# Patient Record
Sex: MALE | Race: ASIAN | NOT HISPANIC OR LATINO | Employment: FULL TIME | URBAN - METROPOLITAN AREA
[De-identification: names, ages, dates, MRNs, and addresses within clinical notes are randomized per-mention and may not be internally consistent; named-entity substitution may affect disease eponyms.]

---

## 2017-10-20 ENCOUNTER — ALLSCRIPTS OFFICE VISIT (OUTPATIENT)
Dept: OTHER | Facility: OTHER | Age: 42
End: 2017-10-20

## 2017-10-20 DIAGNOSIS — Z00.00 ENCOUNTER FOR GENERAL ADULT MEDICAL EXAMINATION WITHOUT ABNORMAL FINDINGS: ICD-10-CM

## 2017-10-20 DIAGNOSIS — E66.3 OVERWEIGHT: ICD-10-CM

## 2017-10-21 ENCOUNTER — LAB CONVERSION - ENCOUNTER (OUTPATIENT)
Dept: OTHER | Facility: OTHER | Age: 42
End: 2017-10-21

## 2017-10-21 LAB
A/G RATIO (HISTORICAL): 1.8 (CALC) (ref 1–2.5)
ALBUMIN SERPL BCP-MCNC: 4.5 G/DL (ref 3.6–5.1)
ALP SERPL-CCNC: 66 U/L (ref 40–115)
ALT SERPL W P-5'-P-CCNC: 54 U/L (ref 9–46)
AST SERPL W P-5'-P-CCNC: 24 U/L (ref 10–40)
BASOPHILS # BLD AUTO: 1.2 %
BASOPHILS # BLD AUTO: 80 CELLS/UL (ref 0–200)
BILIRUB SERPL-MCNC: 0.4 MG/DL (ref 0.2–1.2)
BUN SERPL-MCNC: 17 MG/DL (ref 7–25)
BUN/CREA RATIO (HISTORICAL): ABNORMAL (CALC) (ref 6–22)
CALCIUM SERPL-MCNC: 9.5 MG/DL (ref 8.6–10.3)
CHLORIDE SERPL-SCNC: 104 MMOL/L (ref 98–110)
CHOLEST SERPL-MCNC: 171 MG/DL
CHOLEST/HDLC SERPL: 5.7 (CALC)
CO2 SERPL-SCNC: 29 MMOL/L (ref 20–31)
CREAT SERPL-MCNC: 0.97 MG/DL (ref 0.6–1.35)
DEPRECATED RDW RBC AUTO: 13.2 % (ref 11–15)
EGFR AFRICAN AMERICAN (HISTORICAL): 110 ML/MIN/1.73M2
EGFR-AMERICAN CALC (HISTORICAL): 95 ML/MIN/1.73M2
EOSINOPHIL # BLD AUTO: 208 CELLS/UL (ref 15–500)
EOSINOPHIL # BLD AUTO: 3.1 %
GAMMA GLOBULIN (HISTORICAL): 2.5 G/DL (CALC) (ref 1.9–3.7)
GLUCOSE (HISTORICAL): 102 MG/DL (ref 65–99)
HCT VFR BLD AUTO: 43.3 % (ref 38.5–50)
HDLC SERPL-MCNC: 30 MG/DL
HGB BLD-MCNC: 15 G/DL (ref 13.2–17.1)
LDL CHOLESTEROL (HISTORICAL): 109 MG/DL (CALC)
LYMPHOCYTES # BLD AUTO: 2660 CELLS/UL (ref 850–3900)
LYMPHOCYTES # BLD AUTO: 39.7 %
MCH RBC QN AUTO: 30.9 PG (ref 27–33)
MCHC RBC AUTO-ENTMCNC: 34.6 G/DL (ref 32–36)
MCV RBC AUTO: 89.3 FL (ref 80–100)
MONOCYTES # BLD AUTO: 409 CELLS/UL (ref 200–950)
MONOCYTES (HISTORICAL): 6.1 %
NEUTROPHILS # BLD AUTO: 3343 CELLS/UL (ref 1500–7800)
NEUTROPHILS # BLD AUTO: 49.9 %
NON-HDL-CHOL (CHOL-HDL) (HISTORICAL): 141 MG/DL (CALC)
PLATELET # BLD AUTO: 306 THOUSAND/UL (ref 140–400)
PMV BLD AUTO: 9.3 FL (ref 7.5–12.5)
POTASSIUM SERPL-SCNC: 4.4 MMOL/L (ref 3.5–5.3)
RBC # BLD AUTO: 4.85 MILLION/UL (ref 4.2–5.8)
SODIUM SERPL-SCNC: 139 MMOL/L (ref 135–146)
TOTAL PROTEIN (HISTORICAL): 7 G/DL (ref 6.1–8.1)
TRIGL SERPL-MCNC: 206 MG/DL
TSH SERPL DL<=0.05 MIU/L-ACNC: 1.37 MIU/L (ref 0.4–4.5)
WBC # BLD AUTO: 6.7 THOUSAND/UL (ref 3.8–10.8)

## 2017-10-22 ENCOUNTER — GENERIC CONVERSION - ENCOUNTER (OUTPATIENT)
Dept: OTHER | Facility: OTHER | Age: 42
End: 2017-10-22

## 2017-10-23 ENCOUNTER — GENERIC CONVERSION - ENCOUNTER (OUTPATIENT)
Dept: OTHER | Facility: OTHER | Age: 42
End: 2017-10-23

## 2018-01-14 VITALS — SYSTOLIC BLOOD PRESSURE: 124 MMHG | DIASTOLIC BLOOD PRESSURE: 84 MMHG

## 2018-01-14 NOTE — PROGRESS NOTES
Assessment    1  Family history of diabetes mellitus (V18 0) (Z83 3) : Father   2  Family history of Benign hypertension : Father   3  Family history of coronary artery disease (V17 3) (Z82 49) : Father   4  Never a smoker   5  Social alcohol use (Z78 9)   6  Encounter for preventive health examination (V70 0) (Z00 00)   7  Overweight (278 02) (E66 3)    Plan  Health Maintenance, Overweight    · (1) CBC/PLT/DIFF; Status:Active; Requested MOR:[id number];    · (1) COMPREHENSIVE METABOLIC PANEL; Status:Active; Requested NNE:[id number];    · (1) LIPID PANEL, FASTING; Status:Active; Requested for:2017;    · (1) TSH WITH FT4 REFLEX; Status:Active; Requested [id number];    · Follow-up visit in 4 Months Evaluation and Treatment  Follow-up  Status: Complete  Done:  2017   · Influenza; INJECT 0 5  ML Intramuscular; To Be Done: [id number]    Discussion/Summary  Impression: health maintenance visit, healthy adult male  Currently, he eats an adequate diet and has an adequate exercise regimen  Prostate cancer screening: PSA is not indicated  Testicular cancer screening: the risks and benefits of testicular cancer screening were discussed, self testicular exam technique was taught, monthly self testicular exam was advised, testicular cancer screening is current and clinical testicular exam was done today  Colorectal cancer screening: colorectal cancer screening is not indicated  Screening lab work includes glucose, lipid profile and CBC and TSH  The risks and benefits of immunizations were discussed, immunizations are up to date and Influenza Vaccine was given IM today, and tolerated well  He was advised to be evaluated by a dentist  Advice and education were given regarding nutrition, aerobic exercise, weight loss and Pt to focus on a lower carb diet, and continuing his regular exercise  Patient discussion: discussed with the patient, Stone is to f/u in 4 months, or sooner PRN        Chief Complaint  PT is being seen today for a HM visit  has a NEW PT  PT is fasting for BW  History of Present Illness  HM, Adult Male: The patient is being seen for a health maintenance evaluation  The last health maintenance visit was 4 year(s) ago  General Health: The patient's health since the last visit is described as good   PMHx: None  FamHx significant - Father (non-smoker)  Non-smoker, , 2 kids, 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[employer], running  There is no reported family hx for prostate or colon cancer  He denies vision problems  He denies hearing loss  Immunizations status: up to date  Lifestyle:  He does not have a healthy diet  He has weight concerns  He exercises regularly  He does not use tobacco  He consumes alcohol  He reports occasional alcohol use  He denies drug use  Reproductive health:  the patient is sexually active  He denies erectile dysfunction  Screening: cancer screening reviewed and current  metabolic screening reviewed and current  risk screening reviewed and current  Additional History:  No CP/SOB  No abd pain or rectal bleeding  Urine flow is fine  No ED  No anxiety or depression  HPI: As above  Review of Systems    Constitutional: as noted in HPI  Cardiovascular: as noted in HPI  Respiratory: as noted in HPI  Gastrointestinal: as noted in HPI  Genitourinary: as noted in HPI  Psychiatric: as noted in HPI  Family History  Father    · Family history of Benign hypertension   · Family history of coronary artery disease (V17 3) (Z82 49)   · Family history of diabetes mellitus (V18 0) (Z83 3)    Social History    · Never a smoker   · Social alcohol use (Z78 9)    Current Meds   1  No Reported Medications Recorded    Allergies    1  No Known Drug Allergies    Vitals   Recorded: [id number] 20:39SM   Systolic 157, Sitting   Diastolic 84, Sitting     Physical Exam    Constitutional   General appearance: No acute distress, well appearing and well nourished    NAD; VSS; pleasant  Head and Face   Head and face: Normal     Eyes   Conjunctiva and lids: No erythema, swelling or discharge  Ears, Nose, Mouth, and Throat   External inspection of ears and nose: Normal     Otoscopic examination: Tympanic membranes translucent with normal light reflex  Canals patent without erythema  Hearing: Normal     Lips, teeth, and gums: Normal, good dentition  Oropharynx: Normal with no erythema, edema, exudate or lesions  Neck   Neck: Supple, symmetric, trachea midline, no masses  Pulmonary   Respiratory effort: No increased work of breathing or signs of respiratory distress  Auscultation of lungs: Clear to auscultation  Cardiovascular   Auscultation of heart: Normal rate and rhythm, normal S1 and S2, no murmurs  Pedal pulses: 2+ bilaterally  Examination of extremities for edema and/or varicosities: Normal     Abdomen   Abdomen: Non-tender, no masses  Liver and spleen: No hepatomegaly or splenomegaly  Examination for hernias: No hernias appreciated  Genitourinary   Scrotal contents: Normal testes, no masses  Penis: Normal, no lesions  Lymphatic   Palpation of lymph nodes in neck: No lymphadenopathy  Musculoskeletal   Gait and station: Normal     Psychiatric   Judgment and insight: Normal     Orientation to person, place and time: Normal     Recent and remote memory: Intact  Mood and affect: Normal        Results/Data  PHQ-2 Adult Depression Screening 20Oct2017 10:44AM User, Ahs     Test Name Result Flag Reference   PHQ-2 Adult Depression Score 0     Over the last two weeks, how often have you been bothered by any of the following problems?   Little interest or pleasure in doing things: Not at all - 0  Feeling down, depressed, or hopeless: Not at all - 0   PHQ-2 Adult Depression Screening Negative         Future Appointments    Date/Time Provider Specialty Site   02/23/2018 10:45 AM Carolynn Cleaning,  Family Medicine [address] [facility] Signatures   Electronically signed by : Maciej Asif DO; Oct 20 2017  1:19PM EST                       (Author)

## 2018-01-17 NOTE — RESULT NOTES
Verified Results  (1) COMPREHENSIVE METABOLIC PANEL [id number] 41:94FI Hira Cleaning     Test Name Result Flag Reference   GLUCOSE 102 mg/dL H 65-99   Fasting reference interval     For someone without known diabetes, a glucose value  between 100 and 125 mg/dL is consistent with  prediabetes and should be confirmed with a  follow-up test    UREA NITROGEN (BUN) 17 mg/dL  7-25   CREATININE 0 97 mg/dL  0 60-1 35   eGFR NON-AFR  AMERICAN 95 mL/min/1 73m2  > OR = 60   eGFR AFRICAN AMERICAN 110 mL/min/1 73m2  > OR = 60   BUN/CREATININE RATIO   8-20   NOT APPLICABLE (calc)   SODIUM 139 mmol/L  135-146   POTASSIUM 4 4 mmol/L  3 5-5 3   CHLORIDE 104 mmol/L     CARBON DIOXIDE 29 mmol/L  20-31   CALCIUM 9 5 mg/dL  8 6-10 3   PROTEIN, TOTAL 7 0 g/dL  6 1-8 1   ALBUMIN 4 5 g/dL  3 6-5 1   GLOBULIN 2 5 g/dL (calc)  1 9-3 7   ALBUMIN/GLOBULIN RATIO 1 8 (calc)  1 0-2 5   BILIRUBIN, TOTAL 0 4 mg/dL  0 2-1 2   ALKALINE PHOSPHATASE 66 U/L     AST 24 U/L  10-40   ALT 54 U/L H 9-46     (1) LIPID PANEL, FASTING 20Oct2017 11:44AM Hira Cleaning     Test Name Result Flag Reference   CHOLESTEROL, TOTAL 171 mg/dL  <200   HDL CHOLESTEROL 30 mg/dL L >79   TRIGLICERIDES 018 mg/dL H <150   LDL-CHOLESTEROL 109 mg/dL (calc) H    Reference range: <100     Desirable range <100 mg/dL for patients with CHD or  diabetes and <70 mg/dL for diabetic patients with  known heart disease  LDL-C is now calculated using the Paul-Thompson   calculation, which is a validated novel method providing   better accuracy than the Friedewald equation in the   estimation of LDL-C  Elsie Buitrago  Henrene Batman  8562;863(31): 2434-3153   (http://InsideSales.com/faq/TEI360)   CHOL/HDLC RATIO 5 7 (calc) H <5 0   NON HDL CHOLESTEROL 141 mg/dL (calc) H <130   For patients with diabetes plus 1 major ASCVD risk   factor, treating to a non-HDL-C goal of <100 mg/dL   (LDL-C of <70 mg/dL) is considered a therapeutic   option       (1) CBC/PLT/DIFF [id number] 11:44AM Hira Cleaning     Test Name Result Flag Reference   WHITE BLOOD CELL COUNT 6 7 Thousand/uL  3 8-10 8   RED BLOOD CELL COUNT 4 85 Million/uL  4 20-5 80   HEMOGLOBIN 15 0 g/dL  13 2-17 1   HEMATOCRIT 43 3 %  38 5-50 0   MCV 89 3 fL  80 0-100 0   MCH 30 9 pg  27 0-33 0   MCHC 34 6 g/dL  32 0-36 0   RDW 13 2 %  11 0-15 0   PLATELET COUNT 653 Thousand/uL  140-400   ABSOLUTE NEUTROPHILS 3343 cells/uL  3461-9577   ABSOLUTE LYMPHOCYTES 2660 cells/uL  850-3900   ABSOLUTE MONOCYTES 409 cells/uL  200-950   ABSOLUTE EOSINOPHILS 208 cells/uL     ABSOLUTE BASOPHILS 80 cells/uL  0-200   NEUTROPHILS 49 9 %     LYMPHOCYTES 39 7 %     MONOCYTES 6 1 %     EOSINOPHILS 3 1 %     BASOPHILS 1 2 %     MPV 9 3 fL  7 5-12 5     (Q) TSH, 3RD GENERATION W/REFLEX TO FT4 20Oct2017 11:44AM Hira Cleaning   REPORT COMMENT:  FASTING:YES     Test Name Result Flag Reference   TSH W/REFLEX TO FT4 1 37 mIU/L  0 40-4 50

## 2018-02-23 ENCOUNTER — OFFICE VISIT (OUTPATIENT)
Dept: FAMILY MEDICINE CLINIC | Facility: CLINIC | Age: 43
End: 2018-02-23
Payer: COMMERCIAL

## 2018-02-23 VITALS
RESPIRATION RATE: 16 BRPM | DIASTOLIC BLOOD PRESSURE: 64 MMHG | SYSTOLIC BLOOD PRESSURE: 116 MMHG | HEART RATE: 64 BPM | WEIGHT: 245 LBS

## 2018-02-23 DIAGNOSIS — E78.1 HYPERTRIGLYCERIDEMIA: Primary | ICD-10-CM

## 2018-02-23 PROCEDURE — 99213 OFFICE O/P EST LOW 20 MIN: CPT | Performed by: FAMILY MEDICINE

## 2018-02-23 NOTE — ASSESSMENT & PLAN NOTE
Stone is stable on exam   He is to f/u here in 6 months, or sooner PRN  We again discussed his labs today  Pt is to continue his lower carb diet, with also less red meat / fried food / butter, regular exercise, and work on weight loss  He may very well have some fatty liver by his labs  Next FBW was ordered

## 2018-02-23 NOTE — PROGRESS NOTES
Assessment/Plan:    Hypertriglyceridemia  Stone is stable on exam   He is to f/u here in 6 months, or sooner PRN  We again discussed his labs today  Pt is to continue his lower carb diet, with also less red meat / fried food / butter, regular exercise, and work on weight loss  He may very well have some fatty liver by his labs  Next FBW was ordered  Diagnoses and all orders for this visit:    Hypertriglyceridemia  -     Comprehensive metabolic panel; Future  -     Lipid panel; Future          Subjective:      Patient ID: Basia Melo is a 36 y o  male  Last labs - In allscripts reviewed  Watching his diet; has been doing the cardio exercise  The following portions of the patient's history were reviewed and updated as appropriate: allergies, current medications, past family history, past social history, past surgical history and problem list     No past medical history on file  No current outpatient prescriptions on file  No Known Allergies      Review of Systems   Constitutional: Negative for activity change  Respiratory: Negative for shortness of breath  Cardiovascular: Negative for chest pain  Objective:      /64 (BP Location: Right arm, Patient Position: Sitting, Cuff Size: Standard)   Pulse 64   Resp 16   Wt 111 kg (245 lb)          Physical Exam   Constitutional: He is oriented to person, place, and time  He appears well-developed and well-nourished  No distress  HENT:   Head: Normocephalic and atraumatic  Eyes: Conjunctivae are normal    Cardiovascular: Normal rate, regular rhythm and normal heart sounds  Exam reveals no gallop and no friction rub  No murmur heard  Pulmonary/Chest: Breath sounds normal  No respiratory distress  He has no wheezes  He has no rales  Neurological: He is alert and oriented to person, place, and time  Skin: He is not diaphoretic  Psychiatric: He has a normal mood and affect   His behavior is normal  Judgment and thought content normal    Nursing note and vitals reviewed

## 2018-03-14 ENCOUNTER — TRANSCRIBE ORDERS (OUTPATIENT)
Dept: PHYSICAL THERAPY | Facility: CLINIC | Age: 43
End: 2018-03-14

## 2018-03-14 ENCOUNTER — EVALUATION (OUTPATIENT)
Dept: PHYSICAL THERAPY | Facility: CLINIC | Age: 43
End: 2018-03-14
Payer: COMMERCIAL

## 2018-03-14 DIAGNOSIS — M19.019 ACROMIOCLAVICULAR JOINT ARTHRITIS: Primary | ICD-10-CM

## 2018-03-14 DIAGNOSIS — M25.512 CHRONIC LEFT SHOULDER PAIN: ICD-10-CM

## 2018-03-14 DIAGNOSIS — M19.019 OSTEOARTHRITIS OF ACROMIOCLAVICULAR JOINT: Primary | ICD-10-CM

## 2018-03-14 DIAGNOSIS — G89.29 CHRONIC LEFT SHOULDER PAIN: ICD-10-CM

## 2018-03-14 PROCEDURE — 97162 PT EVAL MOD COMPLEX 30 MIN: CPT

## 2018-03-14 PROCEDURE — G8985 CARRY GOAL STATUS: HCPCS

## 2018-03-14 PROCEDURE — 97110 THERAPEUTIC EXERCISES: CPT

## 2018-03-14 PROCEDURE — G8984 CARRY CURRENT STATUS: HCPCS

## 2018-03-14 NOTE — LETTER
2018    Reford DO Tien  [place] [address] [facility]    Patient: Andrew Adler   YOB: 1975   Date of Visit: 3/14/2018     Encounter Diagnosis     ICD-10-CM    1  Osteoarthritis of acromioclavicular joint M19 019 CANCELED: PT plan of care cert/re-cert   2  Chronic left shoulder pain M25 512 CANCELED: PT plan of care cert/re-cert    A85 57        Dear Dr Elle Kaplan:    Please review the attached Plan of Care from Stone Frank's recent visit  Please verify that you agree therapy should continue by signing the attached document and sending it back to our office  If you have any questions or concerns, please don't hesitate to call  Sincerely,    Darylene Pebbles, PT      Referring Provider:      I certify that I have read the below Plan of Care and certify the need for these services furnished under this plan of treatment while under my care  Reford DO Tien  [address] [facility]: [phone]          PT Evaluation        Today's date: 3/22/2018  Patient name: Andrew Adler  : 1975  MRN: [id number]  Referring provider: Lizette Flores DO  Dx:   Encounter Diagnosis     ICD-10-CM    1  Osteoarthritis of acromioclavicular joint M19 019 PT plan of care cert/re-cert   2  Chronic left shoulder pain M25 512 PT plan of care cert/re-cert    Z56 57        Start Time: 705  Stop Time: 745  Total time in clinic (min): 40 minutes    Assessment  Impairments: abnormal or restricted ROM, activity intolerance, impaired physical strength, lacks appropriate home exercise program and pain with function    Assessment details: Patient is a 35 y/o male who presents with the above impairments  Patient will benefit from skilled PT to minimize impairments and maximize function   Thank you for the referral    Understanding of Dx/Px/POC: good   Prognosis: good    Goals  Patient will demonstrate minimum 1/2 grade MMT improvement in scapular stabilizers  Patient will demonstrate painfree shoulder AROM with minimum 10 degrees improvement in flexion, abduction  Patient will report 50% improvement in pain  Patient will report 50% improvement in pain levels with functional activities  Plan  Planned therapy interventions: joint mobilization, manual therapy, massage, neuromuscular re-education, stretching, therapeutic exercise, functional ROM exercises, flexibility, graded exercise and home exercise program  Frequency: 2x week  Duration in visits: 8  Duration in weeks: 6  Plan details: 2x a week decreased to 1x per week        Subjective Evaluation    History of Present Illness  Mechanism of injury: Patient reports shoulder pain began last November  He reports doing conservative exercises for 4 weeks with minimal improvement  The pain has not stopped him from doing any activities  He reports clicking, but no locking  He denies pain down his arm  Denies numbness, tingling, burning  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, he reports enjoying playing volleyball, riding a motorcycle, soccer with the kids     Pain  No pain reported  Current pain ratin  At best pain ratin  At worst pain rating: 3    Patient Goals  Patient goal: Improvement in pain levels        Objective     Cervical/Thoracic Screen   Cervical range of motion within normal limits with the following exceptions: Cervical ROM WNL and painfree    Active Range of Motion   Left Shoulder   Flexion: 125 degrees   Extension: 68 degrees with pain  Abduction: 138 degrees   External rotation BTH: T2   Internal rotation BTB: L2 with pain    Right Shoulder   Flexion: 145 degrees   Extension: 80 degrees   Abduction: 130 degrees   External rotation BTH: T2   Internal rotation BTB: L2     Passive Range of Motion   Left Shoulder   Flexion: 155 degrees   Abduction: 155 degrees   External rotation 90°:  75 degrees   Internal rotation 90°:  15 degrees     Right Shoulder   Flexion: 165 degrees   Abduction: 165 degrees   External rotation 90°:  80 degrees   Internal rotation 90°:  30 degrees     Strength/Myotome Testing     Left Shoulder     Planes of Motion   Flexion: 5   Abduction: 4+   External rotation at 0°:  4+     Isolated Muscles   Latissimus: 4+   Lower trapezius: 4   Middle trapezius: 4+   Rhomboids: 4+     Right Shoulder     Planes of Motion   Flexion: 5   Abduction: 5   External rotation at 0°:  5     Tests     Left Shoulder   Negative Hawkin's  General Comments     Shoulder Comments   Patient is tender to palpation over the R [facility] joint/ anterior shoulder  Not tender to palpation over clavicle, lateral shoulder, greater tubercle, biceps, triceps, UT supraspinatus/ infraspinatus muscle belly  Flowsheet Rows    Flowsheet Row Most Recent Value   PT/OT G-Codes   FOTO information reviewed  Yes   Assessment Type  Evaluation   G code set  Carrying, Moving & Handling Objects   Carrying, Moving and Handling Objects Current Status ()  CI   Carrying, Moving and Handling Objects Goal Status ()  CI          Precautions: BMI > 30;  Hx LBP    Daily Treatment Diary     Manual              Post/ inf shoulder joint mobs grade 3-4             A-C joint mobs grade 1-2                                                        Exercise Diary              UBE             Sleeper stretch 20 x 5 sec            Pec stretch in wall 20 sec x 3            SB squeezes             Serratus             Bent over LT             Bent over [place], Rhomboids             Shoulder ext stretch             Inferior capsule stretch                                                                                                                                                                Modalities

## 2018-03-14 NOTE — PROGRESS NOTES
PT Evaluation        Today's date: 3/22/2018  Patient name: Basia Melo  : 1975  MRN: [id number]  Referring provider: Uzair Black DO  Dx:   Encounter Diagnosis     ICD-10-CM    1  Osteoarthritis of acromioclavicular joint M19 019 PT plan of care cert/re-cert   2  Chronic left shoulder pain M25 512 PT plan of care cert/re-cert    V22 63        Start Time: 705  Stop Time: 745  Total time in clinic (min): 40 minutes    Assessment  Impairments: abnormal or restricted ROM, activity intolerance, impaired physical strength, lacks appropriate home exercise program and pain with function    Assessment details: Patient is a 35 y/o male who presents with the above impairments  Patient will benefit from skilled PT to minimize impairments and maximize function  Thank you for the referral    Understanding of Dx/Px/POC: good   Prognosis: good    Goals  Patient will demonstrate minimum 1/2 grade MMT improvement in scapular stabilizers  Patient will demonstrate painfree shoulder AROM with minimum 10 degrees improvement in flexion, abduction  Patient will report 50% improvement in pain  Patient will report 50% improvement in pain levels with functional activities  Plan  Planned therapy interventions: joint mobilization, manual therapy, massage, neuromuscular re-education, stretching, therapeutic exercise, functional ROM exercises, flexibility, graded exercise and home exercise program  Frequency: 2x week  Duration in visits: 8  Duration in weeks: 6  Plan details: 2x a week decreased to 1x per week        Subjective Evaluation    History of Present Illness  Mechanism of injury: Patient reports shoulder pain began last November  He reports doing conservative exercises for 4 weeks with minimal improvement  The pain has not stopped him from doing any activities  He reports clicking, but no locking  He denies pain down his arm  Denies numbness, tingling, burning     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, he reports enjoying playing volleyball, riding a motorcycle, soccer with the kids  Pain  No pain reported  Current pain ratin  At best pain ratin  At worst pain rating: 3    Patient Goals  Patient goal: Improvement in pain levels        Objective     Cervical/Thoracic Screen   Cervical range of motion within normal limits with the following exceptions: Cervical ROM WNL and painfree    Active Range of Motion   Left Shoulder   Flexion: 125 degrees   Extension: 68 degrees with pain  Abduction: 138 degrees   External rotation BTH: T2   Internal rotation BTB: L2 with pain    Right Shoulder   Flexion: 145 degrees   Extension: 80 degrees   Abduction: 130 degrees   External rotation BTH: T2   Internal rotation BTB: L2     Passive Range of Motion   Left Shoulder   Flexion: 155 degrees   Abduction: 155 degrees   External rotation 90°: 75 degrees   Internal rotation 90°: 15 degrees     Right Shoulder   Flexion: 165 degrees   Abduction: 165 degrees   External rotation 90°: 80 degrees   Internal rotation 90°: 30 degrees     Strength/Myotome Testing     Left Shoulder     Planes of Motion   Flexion: 5   Abduction: 4+   External rotation at 0°: 4+     Isolated Muscles   Latissimus: 4+   Lower trapezius: 4   Middle trapezius: 4+   Rhomboids: 4+     Right Shoulder     Planes of Motion   Flexion: 5   Abduction: 5   External rotation at 0°: 5     Tests     Left Shoulder   Negative Hawkin's  General Comments     Shoulder Comments   Patient is tender to palpation over the R [facility] joint/ anterior shoulder  Not tender to palpation over clavicle, lateral shoulder, greater tubercle, biceps, triceps, UT supraspinatus/ infraspinatus muscle belly         Flowsheet Rows    Flowsheet Row Most Recent Value   PT/OT G-Codes   FOTO information reviewed  Yes   Assessment Type  Evaluation   G code set  Carrying, Moving & Handling Objects   Carrying, Moving and Handling Objects Current Status ()  CI   Carrying, Moving and Handling Objects Goal Status ()  CI          Precautions: BMI > 30;  Hx LBP    Daily Treatment Diary     Manual              Post/ inf shoulder joint mobs grade 3-4             A-C joint mobs grade 1-2                                                        Exercise Diary              UBE             Sleeper stretch 20 x 5 sec            Pec stretch in wall 20 sec x 3            SB squeezes             Serratus             Bent over LT             Bent over [place], Rhomboids             Shoulder ext stretch             Inferior capsule stretch                                                                                                                                                                Modalities

## 2018-03-14 NOTE — LETTER
2018    Elizabet Oreilly DO  [facility] [address] [address]    Patient: Virginia Osorio   YOB: 1975   Date of Visit: 3/14/2018     Encounter Diagnosis     ICD-10-CM    1  Osteoarthritis of acromioclavicular joint M19 019 PT plan of care cert/re-cert   2  Chronic left shoulder pain M25 512 PT plan of care cert/re-cert    G20 92        Dear Dr Lillie Lorenz:    Please review the attached Plan of Care from Stone Frank's recent visit  Please verify that you agree therapy should continue by signing the attached document and sending it back to our office  If you have any questions or concerns, please don't hesitate to call  Sincerely,    Teo Paz, PT      Referring Provider:      I certify that I have read the below Plan of Care and certify the need for these services furnished under this plan of treatment while under my care  Elizabet Oreilly DO  [address]: [phone]          PT Evaluation        Today's date: 3/22/2018  Patient name: Virginia Osorio  : 1975  MRN: [id number]  Referring provider: Eula San DO  Dx:   Encounter Diagnosis     ICD-10-CM    1  Osteoarthritis of acromioclavicular joint M19 019 PT plan of care cert/re-cert   2  Chronic left shoulder pain M25 512 PT plan of care cert/re-cert    X08 [id number]        Start Time: 705  Stop Time: 0745  Total time in clinic (min): 40 minutes    Assessment  Impairments: abnormal or restricted ROM, activity intolerance, impaired physical strength, lacks appropriate home exercise program and pain with function    Assessment details: Patient is a 35 y/o male who presents with the above impairments  Patient will benefit from skilled PT to minimize impairments and maximize function  Thank you for the referral    Understanding of Dx/Px/POC: good   Prognosis: good    Goals  Patient will demonstrate minimum 1/2 grade MMT improvement in scapular stabilizers    Patient will demonstrate painfree shoulder AROM with minimum 10 degrees improvement in flexion, abduction  Patient will report 50% improvement in pain  Patient will report 50% improvement in pain levels with functional activities  Plan  Planned therapy interventions: joint mobilization, manual therapy, massage, neuromuscular re-education, stretching, therapeutic exercise, functional ROM exercises, flexibility, graded exercise and home exercise program  Frequency: 2x week  Duration in visits: 8  Duration in weeks: 6  Plan details: 2x a week decreased to 1x per week        Subjective Evaluation    History of Present Illness  Mechanism of injury: Patient reports shoulder pain began last November  He reports doing conservative exercises for 4 weeks with minimal improvement  The pain has not stopped him from doing any activities  He reports clicking, but no locking  He denies pain down his arm  Denies numbness, tingling, burning  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, he reports enjoying playing volleyball, riding a motorcycle, soccer with the kids     Pain  No pain reported  Current pain ratin  At best pain ratin  At worst pain rating: 3    Patient Goals  Patient goal: Improvement in pain levels        Objective     Cervical/Thoracic Screen   Cervical range of motion within normal limits with the following exceptions: Cervical ROM WNL and painfree    Active Range of Motion   Left Shoulder   Flexion: 125 degrees   Extension: 68 degrees with pain  Abduction: 138 degrees   External rotation BTH: T2   Internal rotation BTB: L2 with pain    Right Shoulder   Flexion: 145 degrees   Extension: 80 degrees   Abduction: 130 degrees   External rotation BTH: T2   Internal rotation BTB: L2     Passive Range of Motion   Left Shoulder   Flexion: 155 degrees   Abduction: 155 degrees   External rotation 90°:  75 degrees   Internal rotation 90°:  15 degrees     Right Shoulder Flexion: 165 degrees   Abduction: 165 degrees   External rotation 90°:  80 degrees   Internal rotation 90°:  30 degrees     Strength/Myotome Testing     Left Shoulder     Planes of Motion   Flexion: 5   Abduction: 4+   External rotation at 0°:  4+     Isolated Muscles   Latissimus: 4+   Lower trapezius: 4   Middle trapezius: 4+   Rhomboids: 4+     Right Shoulder     Planes of Motion   Flexion: 5   Abduction: 5   External rotation at 0°:  5     Tests     Left Shoulder   Negative Hawkin's  General Comments     Shoulder Comments   Patient is tender to palpation over the R [facility] joint/ anterior shoulder  Not tender to palpation over clavicle, lateral shoulder, greater tubercle, biceps, triceps, UT supraspinatus/ infraspinatus muscle belly  Flowsheet Rows    Flowsheet Row Most Recent Value   PT/OT G-Codes   FOTO information reviewed  Yes   Assessment Type  Evaluation   G code set  Carrying, Moving & Handling Objects   Carrying, Moving and Handling Objects Current Status ()  CI   Carrying, Moving and Handling Objects Goal Status ()  CI          Precautions: BMI > 30;  Hx LBP    Daily Treatment Diary     Manual              Post/ inf shoulder joint mobs grade 3-4             A-C joint mobs grade 1-2                                                        Exercise Diary              UBE             Sleeper stretch 20 x 5 sec            Pec stretch in wall 20 sec x 3            SB squeezes             Serratus             Bent over LT             Bent over [place], Rhomboids             Shoulder ext stretch             Inferior capsule stretch                                                                                                                                                                Modalities

## 2018-03-19 ENCOUNTER — OFFICE VISIT (OUTPATIENT)
Dept: PHYSICAL THERAPY | Facility: CLINIC | Age: 43
End: 2018-03-19
Payer: COMMERCIAL

## 2018-03-19 DIAGNOSIS — G89.29 CHRONIC LEFT SHOULDER PAIN: ICD-10-CM

## 2018-03-19 DIAGNOSIS — M19.019 OSTEOARTHRITIS OF ACROMIOCLAVICULAR JOINT: Primary | ICD-10-CM

## 2018-03-19 DIAGNOSIS — M25.512 CHRONIC LEFT SHOULDER PAIN: ICD-10-CM

## 2018-03-19 PROCEDURE — 97112 NEUROMUSCULAR REEDUCATION: CPT

## 2018-03-19 PROCEDURE — 97110 THERAPEUTIC EXERCISES: CPT

## 2018-03-19 PROCEDURE — 97140 MANUAL THERAPY 1/> REGIONS: CPT

## 2018-03-19 NOTE — PROGRESS NOTES
Daily Note     Today's date: 3/19/2018  Patient name: Norberto Barthel  : 1975  MRN: [id number]  Referring provider: Afia Celis DO  Dx: No diagnosis found  Subjective: Patient reports his arm feels a little bit better  He reports pain with movement is 2-3/10  Objective: See treatment diary below    Precautions: BMI > 30; Hx LBP    Daily Treatment Diary     Manual   3/19           Post/ inf shoulder joint mobs grade 3-4  8 min           A-C joint mobs grade 1-2                                                        Exercise Diary              UBE  2 min fwd/ 2 min bckwd           Sleeper stretch 20 x 5 sec 20 sec x 5           Pec stretch in wall 20 sec x 3 20 sec x 3           SB squeezes  X 15            Serratus  10lb 2 x 10           Prone LT  2 x10; 2lb           Prone Mt, Rhomboids  10x each; 3lb           Horizontal adduction stretch  3 x 20 sec           Inferior capsule stretch  3 x 20 sec                                                                                                                                                              Modalities                                                             Assessment: Tolerated treatment well  Patient reported no increased discomfort with any exercise except serratus punch  Reported improved ROM after manual  Patient demonstrated fatigue post treatment, exhibited good technique with therapeutic exercises and would benefit from continued PT      Plan: Continue per plan of care  Progress treatment as tolerated

## 2018-03-22 ENCOUNTER — OFFICE VISIT (OUTPATIENT)
Dept: PHYSICAL THERAPY | Facility: CLINIC | Age: 43
End: 2018-03-22
Payer: COMMERCIAL

## 2018-03-22 DIAGNOSIS — G89.29 CHRONIC LEFT SHOULDER PAIN: ICD-10-CM

## 2018-03-22 DIAGNOSIS — M25.512 CHRONIC LEFT SHOULDER PAIN: ICD-10-CM

## 2018-03-22 DIAGNOSIS — M19.019 OSTEOARTHRITIS OF ACROMIOCLAVICULAR JOINT: Primary | ICD-10-CM

## 2018-03-22 PROCEDURE — 97140 MANUAL THERAPY 1/> REGIONS: CPT

## 2018-03-22 PROCEDURE — 97112 NEUROMUSCULAR REEDUCATION: CPT

## 2018-03-22 PROCEDURE — 97110 THERAPEUTIC EXERCISES: CPT

## 2018-03-22 NOTE — PROGRESS NOTES
Daily Note     Today's date: 3/22/2018  Patient name: Omega Mendez  : 1975  MRN: [id number]  Referring provider: Daily Pressley DO  Dx:   Encounter Diagnosis     ICD-10-CM    1  Osteoarthritis of acromioclavicular joint M19 019    2  Chronic left shoulder pain M25 512     G89 29                   Subjective: Patient reports a little soreness with shoveling, but he reports pain is 1/10  He is still taking his anti inflammatory  Objective: See treatment diary below    Precautions: BMI > 30; Hx LBP    Daily Treatment Diary     Manual   3/19 3/22          Post/ inf shoulder joint mobs grade 3-4  8 min 4 min          A-C joint mobs grade 1-2   2 min          Scapular mobs   3 min                                        Exercise Diary              UBE  2 min fwd/ 2 min bckwd 2 min fwd/ 2 min bckwd          Sleeper stretch 20 x 5 sec 20 sec x 5 20 sec x 3          Pec stretch in wall 20 sec x 3 20 sec x 3 20 sec x 3          SB squeezes  X 15  X 15          Serratus  10lb 2 x 10 10lb; 2 x 10          Prone LT  2 x10; 2lb 2 x 10; 2lb          Prone Mt, Rhomboids  10x each; 3lb 10x each; 3 lb          Horizontal adduction stretch  3 x 20 sec 3 x 20 sec          Inferior capsule stretch  3 x 20 sec 3 x 20 sec          Body blade   3 x 20 sec                                                                                                                                                Modalities                                                           Assessment: Tolerated treatment well  Patient reported improvement movement and improved pain with manual  Fatiguing with Te  No pain with TE  Patient exhibited good technique with therapeutic exercises and would benefit from continued PT      Plan: Continue per plan of care  Progress treatment as tolerated

## 2018-04-02 ENCOUNTER — OFFICE VISIT (OUTPATIENT)
Dept: PHYSICAL THERAPY | Facility: CLINIC | Age: 43
End: 2018-04-02
Payer: COMMERCIAL

## 2018-04-02 DIAGNOSIS — G89.29 CHRONIC LEFT SHOULDER PAIN: ICD-10-CM

## 2018-04-02 DIAGNOSIS — M25.512 CHRONIC LEFT SHOULDER PAIN: ICD-10-CM

## 2018-04-02 DIAGNOSIS — M19.019 OSTEOARTHRITIS OF ACROMIOCLAVICULAR JOINT: Primary | ICD-10-CM

## 2018-04-02 PROCEDURE — 97110 THERAPEUTIC EXERCISES: CPT

## 2018-04-02 PROCEDURE — 97140 MANUAL THERAPY 1/> REGIONS: CPT

## 2018-04-02 PROCEDURE — 97112 NEUROMUSCULAR REEDUCATION: CPT

## 2018-04-02 NOTE — PROGRESS NOTES
Daily Note     Today's date: 2018  Patient name: Swetha He  : 1975  MRN: [id number]  Referring provider: Jose Francisco Rolon DO  Dx: No diagnosis found  Subjective: Patient reports after last visit his pain was pretty much gone  He reports travelling to [place] for work last week, so not being as diligent with his exercises, so he feels like he's back-tracked pain wise to visit #2  States discomfort is about 2/10  Objective: See treatment diary below    Precautions: BMI > 30; Hx LBP    Daily Treatment Diary     Manual   3/19 3/22 4/2          Inf shoulder joint mobs grade 3-4  8 min 4 min 5 min         A-C joint mobs grade 1-2   2 min 2 min         Scapular mobs   3 min 1 min         Posterior shoulder stretch                              Exercise Diary              UBE  2 min fwd/ 2 min bckwd 2 min fwd/ 2 min bckwd 2 min fwd/ 2 min bckwd         Sleeper stretch 20 x 5 sec 20 sec x 5 20 sec x 3 3 x 20 sec         Pec stretch in wall 20 sec x 3 20 sec x 3 20 sec x 3 3 x 20 sec         SB squeezes  X 15  X 15 X 15         Serratus  10lb 2 x 10 10lb; 2 x 10 10lb 2 x 10         Prone LT  2 x10; 2lb 2 x 10; 2lb 2 x 10; 2lb         Prone Mt, Rhomboids  10x each; 3lb 10x each; 3 lb 10x e 3lb         Horizontal adduction stretch  3 x 20 sec 3 x 20 sec 3 x 20 sec         Inferior capsule stretch  3 x 20 sec 3 x 20 sec 3 x 20 sec         Body blade   3 x 20 sec NP         Shoulder ER    3lb 2 x 10                                                                                                                                  Modalities                                                         Assessment: Tolerated treatment well  Patient reported improve comfort with all AROM post manual, minimal to no symptoms  Patient demonstrated fatigue post treatment, exhibited good technique with therapeutic exercises and would benefit from continued PT      Plan: Continue per plan of care    Progress treatment as tolerated

## 2018-04-05 ENCOUNTER — OFFICE VISIT (OUTPATIENT)
Dept: PHYSICAL THERAPY | Facility: CLINIC | Age: 43
End: 2018-04-05
Payer: COMMERCIAL

## 2018-04-05 DIAGNOSIS — M19.019 OSTEOARTHRITIS OF ACROMIOCLAVICULAR JOINT: Primary | ICD-10-CM

## 2018-04-05 DIAGNOSIS — G89.29 CHRONIC LEFT SHOULDER PAIN: ICD-10-CM

## 2018-04-05 DIAGNOSIS — M25.512 CHRONIC LEFT SHOULDER PAIN: ICD-10-CM

## 2018-04-05 PROCEDURE — 97110 THERAPEUTIC EXERCISES: CPT | Performed by: PHYSICAL THERAPIST

## 2018-04-05 PROCEDURE — 97140 MANUAL THERAPY 1/> REGIONS: CPT | Performed by: PHYSICAL THERAPIST

## 2018-04-05 PROCEDURE — 97112 NEUROMUSCULAR REEDUCATION: CPT | Performed by: PHYSICAL THERAPIST

## 2018-04-05 NOTE — PROGRESS NOTES
Daily Note     Today's date: 2018  Patient name: Giselle Cortes  : 1975  MRN: [id number]  Referring provider: Juanito Ortega DO  Dx:   Encounter Diagnosis     ICD-10-CM    1  Osteoarthritis of acromioclavicular joint M19 019    2  Chronic left shoulder pain M25 512     G89 29        Start Time: 7285  Stop Time: 0208  Total time in clinic (min): 39 minutes    Subjective: Patient reports decreased pain and soreness after last session  Objective: See treatment diary below  Precautions: BMI > 30;  Hx LBP     Daily Treatment Diary      Manual    3/19 3/22 4/2  4/5              Inf shoulder joint mobs grade 3-4   8 min 4 min 5 min  4'             A-C joint mobs grade 1-2     2 min 2 min  2'             Scapular mobs     3 min 1 min  1'             Posterior shoulder stretch                        L first rib depression MWM with L shoulder flexion          2x15                   Exercise Diary                        UBE   2 min fwd/ 2 min bckwd 2 min fwd/ 2 min bckwd 2 min fwd/ 2 min bckwd  2'/2' fwd/bwd             Sleeper stretch 20 x 5 sec 20 sec x 5 20 sec x 3 3 x 20 sec  3 x20"             Pec stretch in wall 20 sec x 3 20 sec x 3 20 sec x 3 3 x 20 sec  3 x20"             SB squeezes   X 15  X 15 X 15  NP             Serratus   10lb 2 x 10 10lb; 2 x 10 10lb 2 x 10  10 lb 2x10             Prone LT   2 x10; 2lb 2 x 10; 2lb 2 x 10; 2lb  3 lb 2x10             Prone Mt, Rhomboids   10x each; 3lb 10x each; 3 lb 10x e 3lb  3 lb 3x10             Horizontal adduction stretch   3 x 20 sec 3 x 20 sec 3 x 20 sec  3 x20"             Inferior capsule stretch   3 x 20 sec 3 x 20 sec 3 x 20 sec  3 x20"             Body blade     3 x 20 sec NP  NP             Shoulder ER       3lb 2 x 10  3 lb 2x10              TB rows          black TB 10x 10"              TB extensions          black TB 10x10"              L first rib depression stretch with strap          10 x10"                                                                                                                                                                   Modalities                                                                                                     Assessment: Tolerated treatment well  Patient would benefit from continued PT  Patient presents with slightly elevated first rib with moderate stiffness  Added manuals and first rib depression stretch with good tolerance and decreased pain post manuals and session today  Plan: Continue per plan of care

## 2018-04-12 ENCOUNTER — OFFICE VISIT (OUTPATIENT)
Dept: PHYSICAL THERAPY | Facility: CLINIC | Age: 43
End: 2018-04-12
Payer: COMMERCIAL

## 2018-04-12 DIAGNOSIS — M25.512 CHRONIC LEFT SHOULDER PAIN: ICD-10-CM

## 2018-04-12 DIAGNOSIS — G89.29 CHRONIC LEFT SHOULDER PAIN: ICD-10-CM

## 2018-04-12 DIAGNOSIS — M19.019 OSTEOARTHRITIS OF ACROMIOCLAVICULAR JOINT: Primary | ICD-10-CM

## 2018-04-12 PROCEDURE — G8986 CARRY D/C STATUS: HCPCS

## 2018-04-12 PROCEDURE — G8985 CARRY GOAL STATUS: HCPCS

## 2018-04-12 NOTE — PROGRESS NOTES
PT Discharge       Today's date: 2018  Patient name: Nataliia Hsieh  : 1975  MRN: [id number]  Referring provider: Ezra Pretty DO  Dx:   Encounter Diagnosis     ICD-10-CM    1  Osteoarthritis of acromioclavicular joint M19 019    2  Chronic left shoulder pain M25 512     G89 29                   Assessment  Impairments: abnormal or restricted ROM, activity intolerance, impaired physical strength, lacks appropriate home exercise program and pain with function    Assessment details: Patient demonstrates improved pain levels, improved function, improved shoulder ROM, improved strength  Still demonstrates slight difference in L scapular stabilizers compared to R that can be managed with home program  Patient has no further questions about home program and is safe for discharge  Thank you for the referral    Understanding of Dx/Px/POC: good   Prognosis: good    Goals  Patient will demonstrate minimum 1/2 grade MMT improvement in scapular stabilizers  --Goal partially met  Patient will demonstrate painfree shoulder AROM with minimum 10 degrees improvement in flexion, abduction --Goal met  Patient will report 50% improvement in pain  --Goal met  Patient will report 50% improvement in pain levels with functional activities  --Goal met    Plan  Planned therapy interventions: joint mobilization, manual therapy, massage, neuromuscular re-education, stretching, therapeutic exercise, functional ROM exercises, flexibility, graded exercise and home exercise program  Frequency: 2x week  Duration in visits: 8  Duration in weeks: 6  Plan details: 2x a week decreased to 1x per week        Subjective Evaluation    History of Present Illness  Mechanism of injury: Patient reports feeling like he made 99% improvement with starting physical therapy  He reports there are no activities that he has pain or difficulty with     Pain  Current pain ratin  At best pain ratin  At worst pain ratin    Patient Goals  Patient goal: Improvement in pain levels        Objective     Cervical/Thoracic Screen   Cervical range of motion within normal limits with the following exceptions: Cervical ROM WNL and painfree    Active Range of Motion   Left Shoulder   Flexion: 138 degrees   Extension: 74 degrees   Abduction: 155 degrees   External rotation BTH: T2   Internal rotation BTB: L2 with pain    Right Shoulder   Flexion: 145 degrees   Extension: 80 degrees   Abduction: 150 degrees   External rotation BTH: T2   Internal rotation BTB: L2     Passive Range of Motion   Left Shoulder   Flexion: 168 degrees   Abduction: 170 degrees   External rotation 90°: 82 degrees   Internal rotation 90°: 40 degrees     Right Shoulder   Flexion: 165 degrees   Abduction: 165 degrees   External rotation 90°: 80 degrees   Internal rotation 90°: 30 degrees     Strength/Myotome Testing     Left Shoulder     Planes of Motion   Flexion: 5   Abduction: 5   External rotation at 0°: 5     Isolated Muscles   Latissimus: 4+   Lower trapezius: 4   Middle trapezius: 4+   Rhomboids: 4+     Right Shoulder     Planes of Motion   Flexion: 5   Abduction: 5   External rotation at 0°: 5     Isolated Muscles   Latissimus: 5   Lower trapezius: 5   Middle trapezius: 5   Rhomboids: 5     Tests     Left Shoulder   Negative Hawkin's  General Comments     Shoulder Comments   Patient is not tender to palpation over the R [facility] joint/ anterior shoulder  Not tender to palpation over clavicle, lateral shoulder, greater tubercle, biceps, triceps, UT supraspinatus/ infraspinatus muscle belly  Precautions: BMI > 30;  Hx LBP         Daily Treatment Diary      Manual    3/19 3/22 4/2  4/5 4/12            Inf shoulder joint mobs grade 3-4   8 min 4 min 5 min  4'             A-C joint mobs grade 1-2     2 min 2 min  2'             Scapular mobs     3 min 1 min  1'             Posterior shoulder stretch                        L first rib depression MWM with L shoulder flexion          2x15                   Exercise Diary             4/12           UBE   2 min fwd/ 2 min bckwd 2 min fwd/ 2 min bckwd 2 min fwd/ 2 min bckwd  2'/2' fwd/bwd  2'/2'           Sleeper stretch 20 x 5 sec 20 sec x 5 20 sec x 3 3 x 20 sec  3 x20"             Pec stretch in wall 20 sec x 3 20 sec x 3 20 sec x 3 3 x 20 sec  3 x20"             SB squeezes   X 15  X 15 X 15  NP             Serratus   10lb 2 x 10 10lb; 2 x 10 10lb 2 x 10  10 lb 2x10             Prone LT   2 x10; 2lb 2 x 10; 2lb 2 x 10; 2lb  3 lb 2x10             Prone Mt, Rhomboids   10x each; 3lb 10x each; 3 lb 10x e 3lb  3 lb 3x10             Horizontal adduction stretch   3 x 20 sec 3 x 20 sec 3 x 20 sec  3 x20"  3 x 20"           Inferior capsule stretch   3 x 20 sec 3 x 20 sec 3 x 20 sec  3 x20"             Body blade     3 x 20 sec NP  NP             Shoulder ER       3lb 2 x 10  3 lb 2x10              TB rows          black TB 10x 10"              TB extensions          black TB 10x10"              L first rib depression stretch with strap          10 x10"  10 x 10"

## 2018-06-28 ENCOUNTER — TRANSCRIBE ORDERS (OUTPATIENT)
Dept: PHYSICAL THERAPY | Facility: CLINIC | Age: 43
End: 2018-06-28

## 2018-08-20 DIAGNOSIS — E78.1 HYPERTRIGLYCERIDEMIA: Primary | ICD-10-CM

## 2018-08-22 LAB
ALBUMIN SERPL-MCNC: 4.3 G/DL (ref 3.6–5.1)
ALBUMIN/GLOB SERPL: 1.7 (CALC) (ref 1–2.5)
ALP SERPL-CCNC: 63 U/L (ref 40–115)
ALT SERPL-CCNC: 40 U/L (ref 9–46)
AST SERPL-CCNC: 24 U/L (ref 10–40)
BILIRUB SERPL-MCNC: 0.5 MG/DL (ref 0.2–1.2)
BUN SERPL-MCNC: 15 MG/DL (ref 7–25)
BUN/CREAT SERPL: ABNORMAL (CALC) (ref 6–22)
CALCIUM SERPL-MCNC: 9.5 MG/DL (ref 8.6–10.3)
CHLORIDE SERPL-SCNC: 105 MMOL/L (ref 98–110)
CHOLEST SERPL-MCNC: 180 MG/DL
CHOLEST/HDLC SERPL: 6.7 (CALC)
CO2 SERPL-SCNC: 29 MMOL/L (ref 20–32)
CREAT SERPL-MCNC: 0.98 MG/DL (ref 0.6–1.35)
GLOBULIN SER CALC-MCNC: 2.5 G/DL (CALC) (ref 1.9–3.7)
GLUCOSE SERPL-MCNC: 102 MG/DL (ref 65–99)
HDLC SERPL-MCNC: 27 MG/DL
NONHDLC SERPL-MCNC: 153 MG/DL (CALC)
POTASSIUM SERPL-SCNC: 4.4 MMOL/L (ref 3.5–5.3)
PROT SERPL-MCNC: 6.8 G/DL (ref 6.1–8.1)
SL AMB EGFR AFRICAN AMERICAN: 108 ML/MIN/1.73M2
SL AMB EGFR NON AFRICAN AMERICAN: 93 ML/MIN/1.73M2
SODIUM SERPL-SCNC: 143 MMOL/L (ref 135–146)
TRIGL SERPL-MCNC: 464 MG/DL

## 2018-08-24 ENCOUNTER — OFFICE VISIT (OUTPATIENT)
Dept: FAMILY MEDICINE CLINIC | Facility: CLINIC | Age: 43
End: 2018-08-24
Payer: COMMERCIAL

## 2018-08-24 VITALS
SYSTOLIC BLOOD PRESSURE: 110 MMHG | OXYGEN SATURATION: 96 % | WEIGHT: 248.2 LBS | DIASTOLIC BLOOD PRESSURE: 82 MMHG | RESPIRATION RATE: 12 BRPM | TEMPERATURE: 98.4 F | BODY MASS INDEX: 32.89 KG/M2 | HEART RATE: 84 BPM | HEIGHT: 73 IN

## 2018-08-24 DIAGNOSIS — R73.01 IMPAIRED FASTING GLUCOSE: Chronic | ICD-10-CM

## 2018-08-24 DIAGNOSIS — E78.1 HYPERTRIGLYCERIDEMIA: Primary | Chronic | ICD-10-CM

## 2018-08-24 PROCEDURE — 1036F TOBACCO NON-USER: CPT | Performed by: FAMILY MEDICINE

## 2018-08-24 PROCEDURE — 3008F BODY MASS INDEX DOCD: CPT | Performed by: FAMILY MEDICINE

## 2018-08-24 PROCEDURE — 99213 OFFICE O/P EST LOW 20 MIN: CPT | Performed by: FAMILY MEDICINE

## 2018-08-24 NOTE — PROGRESS NOTES
Assessment/Plan:    No problem-specific Assessment & Plan notes found for this encounter  Diagnoses and all orders for this visit:    Hypertriglyceridemia  Comments:  Stone to get back to a low carb, less red meat/fried food/butter/cheese diet, & regular exercise  Pt to try OTC Fish Oils 2000mg QD  Reassess labs prior to f/u  Orders:  -     Comprehensive metabolic panel; Future  -     Lipid Panel with Direct LDL reflex; Future    Impaired fasting glucose  Comments:  As above  A1c added to next FBW  Orders:  -     HEMOGLOBIN A1C W/ EAG ESTIMATION; Future          Subjective:      Patient ID: Ector Cummins is a 39 y o  male  Stone and his wife traveled to [place] ([facility]) this summer  He notes that his diet and exercise have been off; a little more ETOH than normal with the travel  Labs - Gluc 102, Cr/LFTs nml, Trig up to 464  The following portions of the patient's history were reviewed and updated as appropriate: allergies, current medications, past family history, past social history, past surgical history and problem list     No past medical history on file  No current outpatient prescriptions on file  No Known Allergies      Review of Systems   Constitutional: Negative for activity change  Respiratory: Negative for shortness of breath  Cardiovascular: Negative for chest pain  Objective:      /82 (BP Location: Right arm, Patient Position: Sitting, Cuff Size: Standard)   Pulse 84   Temp 98 4 °F (36 9 °C)   Resp 12   Ht 6' 1" (1 854 m)   Wt 113 kg (248 lb 3 2 oz)   SpO2 96%   BMI 32 75 kg/m²          Physical Exam   Constitutional: He is oriented to person, place, and time  He appears well-developed and well-nourished  No distress  HENT:   Head: Normocephalic and atraumatic  Eyes: Conjunctivae are normal    Neck: Normal range of motion  Neck supple  No thyromegaly present  Cardiovascular: Normal rate, regular rhythm and normal heart sounds    Exam reveals no gallop and no friction rub  No murmur heard  Pulmonary/Chest: Effort normal and breath sounds normal  No respiratory distress  He has no wheezes  He has no rales  Lymphadenopathy:     He has no cervical adenopathy  Neurological: He is alert and oriented to person, place, and time  Skin: He is not diaphoretic  Psychiatric: He has a normal mood and affect  His behavior is normal  Judgment and thought content normal    Nursing note and vitals reviewed

## 2018-12-22 LAB
ALBUMIN SERPL-MCNC: 4.5 G/DL (ref 3.6–5.1)
ALBUMIN/GLOB SERPL: 1.8 (CALC) (ref 1–2.5)
ALP SERPL-CCNC: 65 U/L (ref 40–115)
ALT SERPL-CCNC: 54 U/L (ref 9–46)
AST SERPL-CCNC: 24 U/L (ref 10–40)
BILIRUB SERPL-MCNC: 0.4 MG/DL (ref 0.2–1.2)
BUN SERPL-MCNC: 15 MG/DL (ref 7–25)
BUN/CREAT SERPL: ABNORMAL (CALC) (ref 6–22)
CALCIUM SERPL-MCNC: 9.5 MG/DL (ref 8.6–10.3)
CHLORIDE SERPL-SCNC: 101 MMOL/L (ref 98–110)
CHOLEST SERPL-MCNC: 172 MG/DL
CHOLEST/HDLC SERPL: 5.7 (CALC)
CO2 SERPL-SCNC: 28 MMOL/L (ref 20–32)
CREAT SERPL-MCNC: 0.91 MG/DL (ref 0.6–1.35)
EST. AVERAGE GLUCOSE BLD GHB EST-MCNC: 126 (CALC)
EST. AVERAGE GLUCOSE BLD GHB EST-SCNC: 7 (CALC)
GLOBULIN SER CALC-MCNC: 2.5 G/DL (CALC) (ref 1.9–3.7)
GLUCOSE SERPL-MCNC: 99 MG/DL (ref 65–99)
HBA1C MFR BLD: 6 % OF TOTAL HGB
HDLC SERPL-MCNC: 30 MG/DL
LDLC SERPL CALC-MCNC: 105 MG/DL (CALC)
NONHDLC SERPL-MCNC: 142 MG/DL (CALC)
POTASSIUM SERPL-SCNC: 4.5 MMOL/L (ref 3.5–5.3)
PROT SERPL-MCNC: 7 G/DL (ref 6.1–8.1)
SL AMB EGFR AFRICAN AMERICAN: 118 ML/MIN/1.73M2
SL AMB EGFR NON AFRICAN AMERICAN: 102 ML/MIN/1.73M2
SODIUM SERPL-SCNC: 137 MMOL/L (ref 135–146)
TRIGL SERPL-MCNC: 241 MG/DL

## 2018-12-24 ENCOUNTER — OFFICE VISIT (OUTPATIENT)
Dept: FAMILY MEDICINE CLINIC | Facility: CLINIC | Age: 43
End: 2018-12-24
Payer: COMMERCIAL

## 2018-12-24 VITALS
HEIGHT: 73 IN | BODY MASS INDEX: 33.05 KG/M2 | HEART RATE: 70 BPM | TEMPERATURE: 98 F | DIASTOLIC BLOOD PRESSURE: 78 MMHG | WEIGHT: 249.4 LBS | SYSTOLIC BLOOD PRESSURE: 110 MMHG | OXYGEN SATURATION: 95 % | RESPIRATION RATE: 16 BRPM

## 2018-12-24 DIAGNOSIS — R73.03 PREDIABETES: Chronic | ICD-10-CM

## 2018-12-24 DIAGNOSIS — E78.1 HYPERTRIGLYCERIDEMIA: Primary | Chronic | ICD-10-CM

## 2018-12-24 DIAGNOSIS — Z23 NEED FOR PROPHYLACTIC VACCINATION AND INOCULATION AGAINST INFLUENZA: ICD-10-CM

## 2018-12-24 PROCEDURE — 90471 IMMUNIZATION ADMIN: CPT

## 2018-12-24 PROCEDURE — 99213 OFFICE O/P EST LOW 20 MIN: CPT | Performed by: FAMILY MEDICINE

## 2018-12-24 PROCEDURE — 90686 IIV4 VACC NO PRSV 0.5 ML IM: CPT

## 2018-12-24 NOTE — PROGRESS NOTES
Assessment/Plan:    No problem-specific Assessment & Plan notes found for this encounter  Diagnoses and all orders for this visit:    Hypertriglyceridemia  Comments:  Pt is stable on exam   Continue OTC Fish Oils  Stone is to continue to work on a lower carb diet, regular exercise, and weight loss  Orders:  -     Lipid Panel with Direct LDL reflex; Future    Prediabetes  -     Comprehensive metabolic panel; Future  -     Lipid Panel with Direct LDL reflex; Future  -     HEMOGLOBIN A1C W/ EAG ESTIMATION; Future    Need for prophylactic vaccination and inoculation against influenza  Comments:  Flu Vaccine given IM today, and tolerated well  Orders:  -     SYRINGE/SINGLE-DOSE VIAL: influenza vaccine, [id number], quadrivalent, 0 5 mL, preservative-free (AFLURIA, FLUARIX, FLULAVAL, FLUZONE)          Subjective:      Patient ID: Kacy Iqbal is a 39 y o  male  +FamHx DM2 - father  Labs reviewed  Is taking Fish Oils  Is exercising  The following portions of the patient's history were reviewed and updated as appropriate: allergies, current medications, past family history, past social history, past surgical history and problem list     No past medical history on file  No current outpatient prescriptions on file  No Known Allergies      Review of Systems   Constitutional: Negative for activity change  Respiratory: Negative for shortness of breath  Cardiovascular: Negative for chest pain  Objective:      /78 (BP Location: Left arm, Patient Position: Sitting, Cuff Size: Large)   Pulse 70   Temp 98 °F (36 7 °C) (Tympanic)   Resp 16   Ht 6' 1" (1 854 m)   Wt 113 kg (249 lb 6 4 oz)   SpO2 95%   BMI 32 90 kg/m²          Physical Exam   Constitutional: He is oriented to person, place, and time  He appears well-developed and well-nourished  No distress  HENT:   Head: Normocephalic and atraumatic  Eyes: Conjunctivae are normal    Neck: Normal range of motion  Neck supple  No thyromegaly present  Cardiovascular: Normal rate, regular rhythm and normal heart sounds  Exam reveals no gallop and no friction rub  No murmur heard  Pulmonary/Chest: Effort normal and breath sounds normal  No respiratory distress  He has no wheezes  He has no rales  Lymphadenopathy:     He has no cervical adenopathy  Neurological: He is alert and oriented to person, place, and time  Skin: He is not diaphoretic  Psychiatric: He has a normal mood and affect  His behavior is normal  Judgment and thought content normal    Nursing note and vitals reviewed

## 2019-04-23 ENCOUNTER — OFFICE VISIT (OUTPATIENT)
Dept: FAMILY MEDICINE CLINIC | Facility: CLINIC | Age: 44
End: 2019-04-23
Payer: COMMERCIAL

## 2019-04-23 VITALS
DIASTOLIC BLOOD PRESSURE: 84 MMHG | SYSTOLIC BLOOD PRESSURE: 128 MMHG | OXYGEN SATURATION: 97 % | RESPIRATION RATE: 18 BRPM | WEIGHT: 256.4 LBS | HEART RATE: 95 BPM | HEIGHT: 73 IN | BODY MASS INDEX: 33.98 KG/M2 | TEMPERATURE: 98.6 F

## 2019-04-23 DIAGNOSIS — R10.11 RUQ ABDOMINAL PAIN: Primary | ICD-10-CM

## 2019-04-23 PROCEDURE — 1036F TOBACCO NON-USER: CPT | Performed by: FAMILY MEDICINE

## 2019-04-23 PROCEDURE — 99214 OFFICE O/P EST MOD 30 MIN: CPT | Performed by: FAMILY MEDICINE

## 2019-04-24 DIAGNOSIS — N18.30 STAGE 3 CHRONIC KIDNEY DISEASE (HCC): Primary | ICD-10-CM

## 2019-04-24 LAB
ALBUMIN SERPL-MCNC: 4.6 G/DL (ref 3.6–5.1)
ALBUMIN/GLOB SERPL: 1.8 (CALC) (ref 1–2.5)
ALP SERPL-CCNC: 70 U/L (ref 40–115)
ALT SERPL-CCNC: 55 U/L (ref 9–46)
APPEARANCE UR: CLEAR
AST SERPL-CCNC: 30 U/L (ref 10–40)
BASOPHILS # BLD AUTO: 81 CELLS/UL (ref 0–200)
BASOPHILS NFR BLD AUTO: 0.9 %
BILIRUB SERPL-MCNC: 0.4 MG/DL (ref 0.2–1.2)
BILIRUB UR QL STRIP: NEGATIVE
BUN SERPL-MCNC: 15 MG/DL (ref 7–25)
BUN/CREAT SERPL: 9 (CALC) (ref 6–22)
CALCIUM SERPL-MCNC: 9.5 MG/DL (ref 8.6–10.3)
CHLORIDE SERPL-SCNC: 100 MMOL/L (ref 98–110)
CO2 SERPL-SCNC: 29 MMOL/L (ref 20–32)
COLOR UR: YELLOW
CREAT SERPL-MCNC: 1.68 MG/DL (ref 0.6–1.35)
EOSINOPHIL # BLD AUTO: 207 CELLS/UL (ref 15–500)
EOSINOPHIL NFR BLD AUTO: 2.3 %
ERYTHROCYTE [DISTWIDTH] IN BLOOD BY AUTOMATED COUNT: 13.6 % (ref 11–15)
GLOBULIN SER CALC-MCNC: 2.5 G/DL (CALC) (ref 1.9–3.7)
GLUCOSE SERPL-MCNC: 110 MG/DL (ref 65–139)
GLUCOSE UR QL STRIP: NEGATIVE
HCT VFR BLD AUTO: 44.4 % (ref 38.5–50)
HGB BLD-MCNC: 15.4 G/DL (ref 13.2–17.1)
HGB UR QL STRIP: NEGATIVE
KETONES UR QL STRIP: NEGATIVE
LEUKOCYTE ESTERASE UR QL STRIP: NEGATIVE
LIPASE SERPL-CCNC: 27 U/L (ref 7–60)
LYMPHOCYTES # BLD AUTO: 3402 CELLS/UL (ref 850–3900)
LYMPHOCYTES NFR BLD AUTO: 37.8 %
MCH RBC QN AUTO: 30.7 PG (ref 27–33)
MCHC RBC AUTO-ENTMCNC: 34.7 G/DL (ref 32–36)
MCV RBC AUTO: 88.6 FL (ref 80–100)
MONOCYTES # BLD AUTO: 666 CELLS/UL (ref 200–950)
MONOCYTES NFR BLD AUTO: 7.4 %
NEUTROPHILS # BLD AUTO: 4644 CELLS/UL (ref 1500–7800)
NEUTROPHILS NFR BLD AUTO: 51.6 %
NITRITE UR QL STRIP: NEGATIVE
PH UR STRIP: 8 [PH] (ref 5–8)
PLATELET # BLD AUTO: 320 THOUSAND/UL (ref 140–400)
PMV BLD REES-ECKER: 9.1 FL (ref 7.5–12.5)
POTASSIUM SERPL-SCNC: 4.3 MMOL/L (ref 3.5–5.3)
PROT SERPL-MCNC: 7.1 G/DL (ref 6.1–8.1)
PROT UR QL STRIP: NEGATIVE
RBC # BLD AUTO: 5.01 MILLION/UL (ref 4.2–5.8)
SL AMB EGFR AFRICAN AMERICAN: 56 ML/MIN/1.73M2
SL AMB EGFR NON AFRICAN AMERICAN: 49 ML/MIN/1.73M2
SODIUM SERPL-SCNC: 138 MMOL/L (ref 135–146)
SP GR UR STRIP: 1.02 (ref 1–1.03)
WBC # BLD AUTO: 9 THOUSAND/UL (ref 3.8–10.8)

## 2019-04-25 ENCOUNTER — HOSPITAL ENCOUNTER (OUTPATIENT)
Dept: RADIOLOGY | Facility: HOSPITAL | Age: 44
Discharge: HOME | End: 2019-04-25
Payer: COMMERCIAL

## 2019-04-25 ENCOUNTER — TRANSCRIBE ORDERS (OUTPATIENT)
Dept: ADMINISTRATIVE | Facility: HOSPITAL | Age: 44
End: 2019-04-25

## 2019-04-25 DIAGNOSIS — R10.11 RUQ ABDOMINAL PAIN: ICD-10-CM

## 2019-04-25 PROCEDURE — 76705 ECHO EXAM OF ABDOMEN: CPT

## 2019-05-02 ENCOUNTER — APPOINTMENT (OUTPATIENT)
Dept: LAB | Facility: CLINIC | Age: 44
End: 2019-05-02
Payer: COMMERCIAL

## 2019-05-02 DIAGNOSIS — N18.30 STAGE 3 CHRONIC KIDNEY DISEASE (HCC): ICD-10-CM

## 2019-05-02 DIAGNOSIS — J20.9 ACUTE BRONCHITIS, UNSPECIFIED ORGANISM: Primary | ICD-10-CM

## 2019-05-02 DIAGNOSIS — R10.11 RUQ ABDOMINAL PAIN: ICD-10-CM

## 2019-05-02 LAB
ALBUMIN SERPL BCP-MCNC: 3.6 G/DL (ref 3.5–5)
ALP SERPL-CCNC: 74 U/L (ref 46–116)
ALT SERPL W P-5'-P-CCNC: 63 U/L (ref 12–78)
ANION GAP SERPL CALCULATED.3IONS-SCNC: 10 MMOL/L (ref 4–13)
AST SERPL W P-5'-P-CCNC: 28 U/L (ref 5–45)
BASOPHILS # BLD AUTO: 0.09 THOUSANDS/ΜL (ref 0–0.1)
BASOPHILS NFR BLD AUTO: 1 % (ref 0–1)
BILIRUB SERPL-MCNC: 0.6 MG/DL (ref 0.2–1)
BILIRUB UR QL STRIP: NEGATIVE
BUN SERPL-MCNC: 15 MG/DL (ref 5–25)
CALCIUM SERPL-MCNC: 9.2 MG/DL (ref 8.3–10.1)
CHLORIDE SERPL-SCNC: 102 MMOL/L (ref 100–108)
CLARITY UR: CLEAR
CO2 SERPL-SCNC: 27 MMOL/L (ref 21–32)
COLOR UR: YELLOW
CREAT SERPL-MCNC: 1.09 MG/DL (ref 0.6–1.3)
EOSINOPHIL # BLD AUTO: 0.41 THOUSAND/ΜL (ref 0–0.61)
EOSINOPHIL NFR BLD AUTO: 4 % (ref 0–6)
ERYTHROCYTE [DISTWIDTH] IN BLOOD BY AUTOMATED COUNT: 13.2 % (ref 11.6–15.1)
GFR SERPL CREATININE-BSD FRML MDRD: 82 ML/MIN/1.73SQ M
GLUCOSE SERPL-MCNC: 107 MG/DL (ref 65–140)
GLUCOSE UR STRIP-MCNC: NEGATIVE MG/DL
HCT VFR BLD AUTO: 42.9 % (ref 36.5–49.3)
HGB BLD-MCNC: 14.8 G/DL (ref 12–17)
HGB UR QL STRIP.AUTO: NEGATIVE
IMM GRANULOCYTES # BLD AUTO: 0.04 THOUSAND/UL (ref 0–0.2)
IMM GRANULOCYTES NFR BLD AUTO: 0 % (ref 0–2)
KETONES UR STRIP-MCNC: NEGATIVE MG/DL
LEUKOCYTE ESTERASE UR QL STRIP: NEGATIVE
LIPASE SERPL-CCNC: 120 U/L (ref 73–393)
LYMPHOCYTES # BLD AUTO: 3.36 THOUSANDS/ΜL (ref 0.6–4.47)
LYMPHOCYTES NFR BLD AUTO: 33 % (ref 14–44)
MCH RBC QN AUTO: 30.6 PG (ref 26.8–34.3)
MCHC RBC AUTO-ENTMCNC: 34.5 G/DL (ref 31.4–37.4)
MCV RBC AUTO: 89 FL (ref 82–98)
MONOCYTES # BLD AUTO: 0.74 THOUSAND/ΜL (ref 0.17–1.22)
MONOCYTES NFR BLD AUTO: 7 % (ref 4–12)
NEUTROPHILS # BLD AUTO: 5.52 THOUSANDS/ΜL (ref 1.85–7.62)
NEUTS SEG NFR BLD AUTO: 55 % (ref 43–75)
NITRITE UR QL STRIP: NEGATIVE
NRBC BLD AUTO-RTO: 0 /100 WBCS
PH UR STRIP.AUTO: 5.5 [PH]
PLATELET # BLD AUTO: 284 THOUSANDS/UL (ref 149–390)
PMV BLD AUTO: 8.8 FL (ref 8.9–12.7)
POTASSIUM SERPL-SCNC: 4 MMOL/L (ref 3.5–5.3)
PROT SERPL-MCNC: 7.5 G/DL (ref 6.4–8.2)
PROT UR STRIP-MCNC: NEGATIVE MG/DL
RBC # BLD AUTO: 4.84 MILLION/UL (ref 3.88–5.62)
SODIUM SERPL-SCNC: 139 MMOL/L (ref 136–145)
SP GR UR STRIP.AUTO: 1.01 (ref 1–1.03)
UROBILINOGEN UR QL STRIP.AUTO: 0.2 E.U./DL
WBC # BLD AUTO: 10.16 THOUSAND/UL (ref 4.31–10.16)

## 2019-05-02 PROCEDURE — 83690 ASSAY OF LIPASE: CPT

## 2019-05-02 PROCEDURE — 36415 COLL VENOUS BLD VENIPUNCTURE: CPT

## 2019-05-02 PROCEDURE — 80053 COMPREHEN METABOLIC PANEL: CPT

## 2019-05-02 PROCEDURE — 81003 URINALYSIS AUTO W/O SCOPE: CPT | Performed by: FAMILY MEDICINE

## 2019-05-02 PROCEDURE — 85025 COMPLETE CBC W/AUTO DIFF WBC: CPT

## 2019-05-02 RX ORDER — AZITHROMYCIN 250 MG/1
TABLET, FILM COATED ORAL
Qty: 6 TABLET | Refills: 0 | Status: SHIPPED | OUTPATIENT
Start: 2019-05-02 | End: 2019-05-07

## 2019-05-14 ENCOUNTER — OFFICE VISIT (OUTPATIENT)
Dept: FAMILY MEDICINE CLINIC | Facility: CLINIC | Age: 44
End: 2019-05-14
Payer: COMMERCIAL

## 2019-05-14 VITALS
HEART RATE: 96 BPM | BODY MASS INDEX: 34.03 KG/M2 | DIASTOLIC BLOOD PRESSURE: 80 MMHG | SYSTOLIC BLOOD PRESSURE: 130 MMHG | TEMPERATURE: 99.6 F | HEIGHT: 73 IN | WEIGHT: 256.8 LBS

## 2019-05-14 DIAGNOSIS — R10.11 RUQ PAIN: Primary | ICD-10-CM

## 2019-05-14 DIAGNOSIS — K76.0 FATTY LIVER: ICD-10-CM

## 2019-05-14 PROCEDURE — 99213 OFFICE O/P EST LOW 20 MIN: CPT | Performed by: FAMILY MEDICINE

## 2019-05-14 PROCEDURE — 3008F BODY MASS INDEX DOCD: CPT | Performed by: FAMILY MEDICINE

## 2021-01-24 DIAGNOSIS — Z23 ENCOUNTER FOR IMMUNIZATION: ICD-10-CM

## 2021-01-31 ENCOUNTER — IMMUNIZATIONS (OUTPATIENT)
Dept: FAMILY MEDICINE CLINIC | Facility: HOSPITAL | Age: 46
End: 2021-01-31

## 2021-01-31 DIAGNOSIS — Z23 ENCOUNTER FOR IMMUNIZATION: Primary | ICD-10-CM

## 2021-01-31 PROCEDURE — 91300 SARS-COV-2 / COVID-19 MRNA VACCINE (PFIZER-BIONTECH) 30 MCG: CPT

## 2021-01-31 PROCEDURE — 0001A SARS-COV-2 / COVID-19 MRNA VACCINE (PFIZER-BIONTECH) 30 MCG: CPT

## 2021-02-03 DIAGNOSIS — R73.03 PREDIABETES: ICD-10-CM

## 2021-02-03 DIAGNOSIS — E78.1 HYPERTRIGLYCERIDEMIA: Primary | ICD-10-CM

## 2021-02-18 ENCOUNTER — IMMUNIZATIONS (OUTPATIENT)
Dept: FAMILY MEDICINE CLINIC | Facility: HOSPITAL | Age: 46
End: 2021-02-18

## 2021-02-18 DIAGNOSIS — Z23 ENCOUNTER FOR IMMUNIZATION: Primary | ICD-10-CM

## 2021-02-18 PROCEDURE — 91300 SARS-COV-2 / COVID-19 MRNA VACCINE (PFIZER-BIONTECH) 30 MCG: CPT

## 2021-02-18 PROCEDURE — 0002A SARS-COV-2 / COVID-19 MRNA VACCINE (PFIZER-BIONTECH) 30 MCG: CPT

## 2021-03-18 LAB
ALBUMIN SERPL-MCNC: 4.2 G/DL (ref 3.6–5.1)
ALBUMIN/GLOB SERPL: 1.6 (CALC) (ref 1–2.5)
ALP SERPL-CCNC: 65 U/L (ref 36–130)
ALT SERPL-CCNC: 28 U/L (ref 9–46)
AST SERPL-CCNC: 16 U/L (ref 10–40)
BILIRUB SERPL-MCNC: 0.5 MG/DL (ref 0.2–1.2)
BUN SERPL-MCNC: 16 MG/DL (ref 7–25)
BUN/CREAT SERPL: ABNORMAL (CALC) (ref 6–22)
CALCIUM SERPL-MCNC: 9.2 MG/DL (ref 8.6–10.3)
CHLORIDE SERPL-SCNC: 99 MMOL/L (ref 98–110)
CHOLEST SERPL-MCNC: 204 MG/DL
CHOLEST/HDLC SERPL: 5.5 (CALC)
CO2 SERPL-SCNC: 28 MMOL/L (ref 20–32)
CREAT SERPL-MCNC: 0.87 MG/DL (ref 0.6–1.35)
EST. AVERAGE GLUCOSE BLD GHB EST-MCNC: 137 (CALC)
EST. AVERAGE GLUCOSE BLD GHB EST-SCNC: 7.6 (CALC)
GLOBULIN SER CALC-MCNC: 2.7 G/DL (CALC) (ref 1.9–3.7)
GLUCOSE SERPL-MCNC: 130 MG/DL (ref 65–99)
HBA1C MFR BLD: 6.4 % OF TOTAL HGB
HDLC SERPL-MCNC: 37 MG/DL
LDLC SERPL CALC-MCNC: 123 MG/DL (CALC)
NONHDLC SERPL-MCNC: 167 MG/DL (CALC)
POTASSIUM SERPL-SCNC: 4.1 MMOL/L (ref 3.5–5.3)
PROT SERPL-MCNC: 6.9 G/DL (ref 6.1–8.1)
SL AMB EGFR AFRICAN AMERICAN: 120 ML/MIN/1.73M2
SL AMB EGFR NON AFRICAN AMERICAN: 103 ML/MIN/1.73M2
SODIUM SERPL-SCNC: 138 MMOL/L (ref 135–146)
TRIGL SERPL-MCNC: 284 MG/DL

## 2021-06-11 ENCOUNTER — OFFICE VISIT (OUTPATIENT)
Dept: FAMILY MEDICINE CLINIC | Facility: CLINIC | Age: 46
End: 2021-06-11
Payer: COMMERCIAL

## 2021-06-11 VITALS
DIASTOLIC BLOOD PRESSURE: 78 MMHG | OXYGEN SATURATION: 98 % | WEIGHT: 246.8 LBS | HEIGHT: 70 IN | RESPIRATION RATE: 16 BRPM | TEMPERATURE: 98 F | HEART RATE: 90 BPM | BODY MASS INDEX: 35.33 KG/M2 | SYSTOLIC BLOOD PRESSURE: 120 MMHG

## 2021-06-11 DIAGNOSIS — M76.61 ACHILLES TENDINITIS OF BOTH LOWER EXTREMITIES: ICD-10-CM

## 2021-06-11 DIAGNOSIS — R73.02 IGT (IMPAIRED GLUCOSE TOLERANCE): ICD-10-CM

## 2021-06-11 DIAGNOSIS — M25.521 RIGHT ELBOW PAIN: ICD-10-CM

## 2021-06-11 DIAGNOSIS — Z13.6 SCREENING FOR CARDIOVASCULAR CONDITION: ICD-10-CM

## 2021-06-11 DIAGNOSIS — E78.1 HYPERTRIGLYCERIDEMIA: ICD-10-CM

## 2021-06-11 DIAGNOSIS — M76.62 ACHILLES TENDINITIS OF BOTH LOWER EXTREMITIES: ICD-10-CM

## 2021-06-11 DIAGNOSIS — Z00.00 ANNUAL PHYSICAL EXAM: Primary | ICD-10-CM

## 2021-06-11 DIAGNOSIS — N62 GYNECOMASTIA: ICD-10-CM

## 2021-06-11 DIAGNOSIS — R53.83 FATIGUE, UNSPECIFIED TYPE: ICD-10-CM

## 2021-06-11 PROCEDURE — 99396 PREV VISIT EST AGE 40-64: CPT | Performed by: FAMILY MEDICINE

## 2021-06-11 PROCEDURE — 3725F SCREEN DEPRESSION PERFORMED: CPT | Performed by: FAMILY MEDICINE

## 2021-06-11 PROCEDURE — 3008F BODY MASS INDEX DOCD: CPT | Performed by: FAMILY MEDICINE

## 2021-06-11 PROCEDURE — 1036F TOBACCO NON-USER: CPT | Performed by: FAMILY MEDICINE

## 2021-06-11 NOTE — PATIENT INSTRUCTIONS

## 2021-06-11 NOTE — PROGRESS NOTES
[address]    NAME: Karen Tatum  AGE: 54 y o  SEX: male  : 1975     DATE: 2021     Assessment and Plan:     Problem List Items Addressed This Visit        Other    Hypertriglyceridemia    Relevant Orders    Lipid Panel with Direct LDL reflex      Other Visit Diagnoses     Annual physical exam    -  Primary    Stone is stable on exam   He is to work on a [employer], & continue regular, lower impact exercise  Repeat FBW prior to next OV  Screening for cardiovascular condition        Fatigue, unspecified type        IGT (impaired glucose tolerance)        Diet and exercise as above  A1c incl with next labs as well  Relevant Orders    HEMOGLOBIN A1C W/ EAG ESTIMATION    Comprehensive metabolic panel    Gynecomastia        Suspected, left breast - likely with weight gain in the last year  Mammogram and diagnostic ultrasound ordered  Relevant Orders    Mammo screening bilateral w cad    US breast left limited (diagnostic)    Achilles tendinitis of both lower extremities        Pt referred locally for Physical Therapy  Relevant Orders    Ambulatory referral to Physical Therapy    Right elbow pain        Xrays ordered; pt can use OTC elbow sleeve  Referred to PT  Relevant Orders    Ambulatory referral to Physical Therapy    XR elbow 2 vw right          Immunizations and preventive care screenings were discussed with patient today  Appropriate education was printed on patient's after visit summary  Counseling:  Dental Health: discussed importance of regular tooth brushing, flossing, and dental visits  · Exercise: the importance of regular exercise/physical activity was discussed  Recommend exercise 3-5 times per week for at least 30 minutes  BMI Counseling: Body mass index is 35 57 kg/m²   The BMI is above normal  Nutrition recommendations include moderation in carbohydrate intake and reducing intake of saturated and trans fat  Exercise recommendations include exercising 3-5 times per week  No pharmacotherapy was ordered  Patient referred to PCP due to patient being overweight  Return in 6 months (on 12/11/2021) for Recheck  Chief Complaint:     Chief Complaint   Patient presents with    Physical Exam     Patient is here today for an annual exam       History of Present Illness:     Adult Annual Physical   Patient here for a comprehensive physical exam  The patient reports no problems  Seeing Stone for the first time since 02/2019  Labs from 03/2021 - A1c up to 6 4%  Had the Standard Dawson vaccine - completed in 02/2021  Diet and Physical Activity  · Diet/Nutrition: well balanced diet  Improved in recent months  · Exercise: 3-4 times a week on average  Depression Screening  PHQ-9 Depression Screening    PHQ-9:   Frequency of the following problems over the past two weeks:      Little interest or pleasure in doing things: 0 - not at all  Feeling down, depressed, or hopeless: 0 - not at all  PHQ-2 Score: 0       General Health  · Sleep: sleeps well  · Hearing: normal - bilateral   · Vision: no vision problems  · Dental: regular dental visits   Health  · Symptoms include: none     Review of Systems:     Review of Systems   Constitutional: Negative for activity change  Respiratory: Negative for shortness of breath  Cardiovascular: Negative for chest pain  Gastrointestinal: Negative for abdominal pain and blood in stool  Genitourinary: Negative for dysuria  Possible growth in the left breast - noticed recently  Musculoskeletal: Positive for arthralgias  Psychiatric/Behavioral: Negative for dysphoric mood  The patient is not nervous/anxious  Past Medical History:     History reviewed  No pertinent past medical history  Past Surgical History:     History reviewed  No pertinent surgical history     Family History:     Family History   Problem Relation Age of Onset    Hypertension Father         Benign; Last Assessed 10/20/2017    Coronary artery disease Father         Last Assessed 10/20/2017    Diabetes Father         Mellitus; Last Assessed 10/20/2017      Social History:        Social History     Socioeconomic History    Marital status: /Civil Union     Spouse name: None    Number of children: None    Years of education: None    Highest education level: None   Occupational History    None   Social Needs    Financial resource strain: None    Food insecurity     Worry: None     Inability: None    Transportation needs     Medical: None     Non-medical: None   Tobacco Use    Smoking status: Never Smoker    Smokeless tobacco: Never Used   Substance and Sexual Activity    Alcohol use: Yes     Frequency: Monthly or less     Drinks per session: 1 or 2     Binge frequency: Never     Comment: Social     Drug use: Never    Sexual activity: Yes     Partners: Female   Lifestyle    Physical activity     Days per week: None     Minutes per session: None    Stress: None   Relationships    Social connections     Talks on phone: None     Gets together: None     Attends [employer] service: None     Active member of club or organization: None     Attends meetings of clubs or organizations: None     Relationship status: None    Intimate partner violence     Fear of current or ex partner: None     Emotionally abused: None     Physically abused: None     Forced sexual activity: None   Other Topics Concern    None   Social History Narrative    None      Current Medications:     No current outpatient medications on file  No current facility-administered medications for this visit         Allergies:     No Known Allergies   Physical Exam:     /78 (BP Location: Left arm, Patient Position: Sitting, Cuff Size: Large)   Pulse 90   Temp 98 °F (36 7 °C) (Tympanic)   Resp 16   Ht 5' 9 84" (1 774 m)   Wt 112 kg (246 lb 12 8 oz)   SpO2 98% BMI 35 57 kg/m²     Physical Exam  Vitals signs and nursing note reviewed  Constitutional:       General: He is not in acute distress  Appearance: Normal appearance  He is not ill-appearing, toxic-appearing or diaphoretic  HENT:      Head: Normocephalic and atraumatic  Eyes:      General: No scleral icterus  Conjunctiva/sclera: Conjunctivae normal    Neck:      Musculoskeletal: Normal range of motion and neck supple  No neck rigidity or muscular tenderness  Vascular: No carotid bruit  Cardiovascular:      Rate and Rhythm: Normal rate and regular rhythm  Heart sounds: Normal heart sounds  No murmur  No friction rub  No gallop  Pulmonary:      Effort: Pulmonary effort is normal  No respiratory distress  Breath sounds: Normal breath sounds  No stridor  No wheezing, rhonchi or rales  Chest:       Abdominal:      General: Abdomen is flat  Bowel sounds are normal  There is no distension  Palpations: Abdomen is soft  There is no mass  Tenderness: There is no abdominal tenderness  There is no guarding or rebound  Musculoskeletal:      Right elbow: He exhibits normal range of motion and no swelling  No medial epicondyle and no lateral epicondyle tenderness noted  Left ankle: He exhibits normal range of motion, no swelling, no deformity and normal pulse  Achilles tendon exhibits no defect and normal Meyers's test results  Arms:         Feet:    Lymphadenopathy:      Cervical: No cervical adenopathy  Neurological:      Mental Status: He is alert and oriented to person, place, and time  Psychiatric:         Mood and Affect: Mood normal          Behavior: Behavior normal          Thought Content:  Thought content normal          Judgment: Judgment normal       Stone was seen today for physical exam     Diagnoses and all orders for this visit:    Annual physical exam  Comments:  Stone is stable on exam   He is to work on a [employer], & continue regular, lower impact exercise  Repeat FBW prior to next OV  Screening for cardiovascular condition    Fatigue, unspecified type    IGT (impaired glucose tolerance)  Comments:  Diet and exercise as above  A1c incl with next labs as well  Orders:  -     HEMOGLOBIN A1C W/ EAG ESTIMATION; Future  -     Comprehensive metabolic panel; Future    Hypertriglyceridemia  Comments:  Diet and exercise stressed as above  Orders:  -     Lipid Panel with Direct LDL reflex; Future    Gynecomastia  Comments:  Suspected, left breast - likely with weight gain in the last year  Mammogram and diagnostic ultrasound ordered  Orders:  -     Mammo screening bilateral w cad; Future  -     US breast left limited (diagnostic); Future    Achilles tendinitis of both lower extremities  Comments:  Pt referred locally for Physical Therapy  Orders:  -     Ambulatory referral to Physical Therapy; Future    Right elbow pain  Comments:  Xrays ordered; pt can use OTC elbow sleeve  Referred to PT  Orders:  -     Ambulatory referral to Physical Therapy;  Future  -     XR elbow 2 vw right; Future        Hira Cleaning DO  [address] [facility]

## 2021-06-21 ENCOUNTER — APPOINTMENT (OUTPATIENT)
Dept: RADIOLOGY | Facility: CLINIC | Age: 46
End: 2021-06-21
Payer: COMMERCIAL

## 2021-06-21 DIAGNOSIS — M25.521 RIGHT ELBOW PAIN: ICD-10-CM

## 2021-06-21 PROCEDURE — 73080 X-RAY EXAM OF ELBOW: CPT

## 2021-07-02 DIAGNOSIS — G89.29 ELBOW PAIN, CHRONIC, RIGHT: Primary | ICD-10-CM

## 2021-07-02 DIAGNOSIS — M25.521 ELBOW PAIN, CHRONIC, RIGHT: Primary | ICD-10-CM

## 2021-07-15 ENCOUNTER — HOSPITAL ENCOUNTER (OUTPATIENT)
Dept: RADIOLOGY | Facility: HOSPITAL | Age: 46
Discharge: HOME | End: 2021-07-15
Payer: COMMERCIAL

## 2021-07-15 VITALS — WEIGHT: 250 LBS | BODY MASS INDEX: 35.79 KG/M2 | HEIGHT: 70 IN

## 2021-07-15 DIAGNOSIS — N62 GYNECOMASTIA: ICD-10-CM

## 2021-07-15 PROCEDURE — G0279 TOMOSYNTHESIS, MAMMO: HCPCS

## 2021-07-15 PROCEDURE — 76642 ULTRASOUND BREAST LIMITED: CPT

## 2021-07-15 PROCEDURE — 77066 DX MAMMO INCL CAD BI: CPT

## 2021-07-15 NOTE — RESULT ENCOUNTER NOTE
Please let the patient know that their mammogram/breast US was normal - he has what looks like a BENIGN lipoma (fatty cyst) in the left breast   If it does not bother him, I suggest leaving it alone  Thanks     Dr Griselda Darnel

## 2021-10-25 ENCOUNTER — EVALUATION (OUTPATIENT)
Dept: PHYSICAL THERAPY | Facility: REHABILITATION | Age: 46
End: 2021-10-25
Payer: COMMERCIAL

## 2021-10-25 DIAGNOSIS — M25.571 CHRONIC ANKLE PAIN, BILATERAL: ICD-10-CM

## 2021-10-25 DIAGNOSIS — G89.29 CHRONIC ANKLE PAIN, BILATERAL: ICD-10-CM

## 2021-10-25 DIAGNOSIS — M25.572 CHRONIC ANKLE PAIN, BILATERAL: ICD-10-CM

## 2021-10-25 DIAGNOSIS — M76.60 INSERTIONAL ACHILLES TENDINOPATHY: Primary | ICD-10-CM

## 2021-10-25 PROCEDURE — 97140 MANUAL THERAPY 1/> REGIONS: CPT | Performed by: PHYSICAL THERAPIST

## 2021-10-25 PROCEDURE — 97161 PT EVAL LOW COMPLEX 20 MIN: CPT | Performed by: PHYSICAL THERAPIST

## 2021-11-02 ENCOUNTER — OFFICE VISIT (OUTPATIENT)
Dept: PHYSICAL THERAPY | Facility: REHABILITATION | Age: 46
End: 2021-11-02
Payer: COMMERCIAL

## 2021-11-02 DIAGNOSIS — M76.60 INSERTIONAL ACHILLES TENDINOPATHY: Primary | ICD-10-CM

## 2021-11-02 DIAGNOSIS — G89.29 CHRONIC ANKLE PAIN, BILATERAL: ICD-10-CM

## 2021-11-02 DIAGNOSIS — M25.572 CHRONIC ANKLE PAIN, BILATERAL: ICD-10-CM

## 2021-11-02 DIAGNOSIS — M25.571 CHRONIC ANKLE PAIN, BILATERAL: ICD-10-CM

## 2021-11-02 PROCEDURE — 97110 THERAPEUTIC EXERCISES: CPT | Performed by: PHYSICAL THERAPIST

## 2021-11-02 PROCEDURE — 97530 THERAPEUTIC ACTIVITIES: CPT | Performed by: PHYSICAL THERAPIST

## 2021-11-02 PROCEDURE — 97140 MANUAL THERAPY 1/> REGIONS: CPT | Performed by: PHYSICAL THERAPIST

## 2021-11-05 ENCOUNTER — OFFICE VISIT (OUTPATIENT)
Dept: PHYSICAL THERAPY | Facility: REHABILITATION | Age: 46
End: 2021-11-05
Payer: COMMERCIAL

## 2021-11-05 DIAGNOSIS — M25.571 CHRONIC ANKLE PAIN, BILATERAL: ICD-10-CM

## 2021-11-05 DIAGNOSIS — M25.572 CHRONIC ANKLE PAIN, BILATERAL: ICD-10-CM

## 2021-11-05 DIAGNOSIS — M76.60 INSERTIONAL ACHILLES TENDINOPATHY: Primary | ICD-10-CM

## 2021-11-05 DIAGNOSIS — G89.29 CHRONIC ANKLE PAIN, BILATERAL: ICD-10-CM

## 2021-11-05 PROCEDURE — 97530 THERAPEUTIC ACTIVITIES: CPT | Performed by: PHYSICAL THERAPIST

## 2021-11-05 PROCEDURE — 97110 THERAPEUTIC EXERCISES: CPT | Performed by: PHYSICAL THERAPIST

## 2021-11-05 PROCEDURE — 97140 MANUAL THERAPY 1/> REGIONS: CPT | Performed by: PHYSICAL THERAPIST

## 2021-11-09 ENCOUNTER — OFFICE VISIT (OUTPATIENT)
Dept: PHYSICAL THERAPY | Facility: REHABILITATION | Age: 46
End: 2021-11-09
Payer: COMMERCIAL

## 2021-11-09 DIAGNOSIS — M25.571 CHRONIC ANKLE PAIN, BILATERAL: ICD-10-CM

## 2021-11-09 DIAGNOSIS — G89.29 CHRONIC ANKLE PAIN, BILATERAL: ICD-10-CM

## 2021-11-09 DIAGNOSIS — M76.60 INSERTIONAL ACHILLES TENDINOPATHY: Primary | ICD-10-CM

## 2021-11-09 DIAGNOSIS — M25.572 CHRONIC ANKLE PAIN, BILATERAL: ICD-10-CM

## 2021-11-09 PROCEDURE — 97530 THERAPEUTIC ACTIVITIES: CPT

## 2021-11-09 PROCEDURE — 97140 MANUAL THERAPY 1/> REGIONS: CPT | Performed by: PHYSICAL THERAPIST

## 2021-11-09 PROCEDURE — 97110 THERAPEUTIC EXERCISES: CPT

## 2021-11-12 ENCOUNTER — OFFICE VISIT (OUTPATIENT)
Dept: PHYSICAL THERAPY | Facility: REHABILITATION | Age: 46
End: 2021-11-12
Payer: COMMERCIAL

## 2021-11-12 DIAGNOSIS — M25.572 CHRONIC ANKLE PAIN, BILATERAL: ICD-10-CM

## 2021-11-12 DIAGNOSIS — G89.29 CHRONIC ANKLE PAIN, BILATERAL: ICD-10-CM

## 2021-11-12 DIAGNOSIS — M25.571 CHRONIC ANKLE PAIN, BILATERAL: ICD-10-CM

## 2021-11-12 DIAGNOSIS — M76.60 INSERTIONAL ACHILLES TENDINOPATHY: Primary | ICD-10-CM

## 2021-11-12 PROCEDURE — 97110 THERAPEUTIC EXERCISES: CPT | Performed by: PHYSICAL THERAPIST

## 2021-11-12 PROCEDURE — 97140 MANUAL THERAPY 1/> REGIONS: CPT | Performed by: PHYSICAL THERAPIST

## 2021-11-12 PROCEDURE — 97530 THERAPEUTIC ACTIVITIES: CPT | Performed by: PHYSICAL THERAPIST

## 2021-11-16 ENCOUNTER — OFFICE VISIT (OUTPATIENT)
Dept: PHYSICAL THERAPY | Facility: REHABILITATION | Age: 46
End: 2021-11-16
Payer: COMMERCIAL

## 2021-11-16 DIAGNOSIS — M25.571 CHRONIC ANKLE PAIN, BILATERAL: ICD-10-CM

## 2021-11-16 DIAGNOSIS — M76.60 INSERTIONAL ACHILLES TENDINOPATHY: Primary | ICD-10-CM

## 2021-11-16 DIAGNOSIS — G89.29 CHRONIC ANKLE PAIN, BILATERAL: ICD-10-CM

## 2021-11-16 DIAGNOSIS — M25.572 CHRONIC ANKLE PAIN, BILATERAL: ICD-10-CM

## 2021-11-16 PROCEDURE — 97140 MANUAL THERAPY 1/> REGIONS: CPT

## 2021-11-16 PROCEDURE — 97110 THERAPEUTIC EXERCISES: CPT

## 2021-11-16 PROCEDURE — 97112 NEUROMUSCULAR REEDUCATION: CPT

## 2021-11-23 ENCOUNTER — OFFICE VISIT (OUTPATIENT)
Dept: PHYSICAL THERAPY | Facility: REHABILITATION | Age: 46
End: 2021-11-23
Payer: COMMERCIAL

## 2021-11-23 DIAGNOSIS — M25.572 CHRONIC ANKLE PAIN, BILATERAL: ICD-10-CM

## 2021-11-23 DIAGNOSIS — M25.571 CHRONIC ANKLE PAIN, BILATERAL: ICD-10-CM

## 2021-11-23 DIAGNOSIS — M76.60 INSERTIONAL ACHILLES TENDINOPATHY: Primary | ICD-10-CM

## 2021-11-23 DIAGNOSIS — G89.29 CHRONIC ANKLE PAIN, BILATERAL: ICD-10-CM

## 2021-11-23 PROCEDURE — 97140 MANUAL THERAPY 1/> REGIONS: CPT | Performed by: PHYSICAL THERAPIST

## 2021-11-23 PROCEDURE — 97110 THERAPEUTIC EXERCISES: CPT | Performed by: PHYSICAL THERAPIST

## 2021-11-23 PROCEDURE — 97530 THERAPEUTIC ACTIVITIES: CPT | Performed by: PHYSICAL THERAPIST

## 2021-11-30 ENCOUNTER — OFFICE VISIT (OUTPATIENT)
Dept: PHYSICAL THERAPY | Facility: REHABILITATION | Age: 46
End: 2021-11-30
Payer: COMMERCIAL

## 2021-11-30 DIAGNOSIS — M76.60 INSERTIONAL ACHILLES TENDINOPATHY: Primary | ICD-10-CM

## 2021-11-30 DIAGNOSIS — M25.571 CHRONIC ANKLE PAIN, BILATERAL: ICD-10-CM

## 2021-11-30 DIAGNOSIS — M25.572 CHRONIC ANKLE PAIN, BILATERAL: ICD-10-CM

## 2021-11-30 DIAGNOSIS — G89.29 CHRONIC ANKLE PAIN, BILATERAL: ICD-10-CM

## 2021-11-30 PROCEDURE — 97110 THERAPEUTIC EXERCISES: CPT

## 2021-11-30 PROCEDURE — 97140 MANUAL THERAPY 1/> REGIONS: CPT

## 2021-11-30 PROCEDURE — 97530 THERAPEUTIC ACTIVITIES: CPT

## 2022-02-03 ENCOUNTER — TELEPHONE (OUTPATIENT)
Dept: FAMILY MEDICINE CLINIC | Facility: CLINIC | Age: 47
End: 2022-02-03

## 2022-02-03 NOTE — TELEPHONE ENCOUNTER
Patient called and stated that he needs his labs for his appointment tomorrow  The need print out for [facility] to [facility]  Please advise

## 2022-02-04 ENCOUNTER — OFFICE VISIT (OUTPATIENT)
Dept: FAMILY MEDICINE CLINIC | Facility: CLINIC | Age: 47
End: 2022-02-04
Payer: COMMERCIAL

## 2022-02-04 VITALS
SYSTOLIC BLOOD PRESSURE: 112 MMHG | WEIGHT: 240.8 LBS | HEART RATE: 85 BPM | RESPIRATION RATE: 12 BRPM | DIASTOLIC BLOOD PRESSURE: 80 MMHG | TEMPERATURE: 98.9 F | BODY MASS INDEX: 34.47 KG/M2 | OXYGEN SATURATION: 97 % | HEIGHT: 70 IN

## 2022-02-04 DIAGNOSIS — E78.1 HYPERTRIGLYCERIDEMIA: ICD-10-CM

## 2022-02-04 DIAGNOSIS — R73.02 IGT (IMPAIRED GLUCOSE TOLERANCE): Primary | ICD-10-CM

## 2022-02-04 LAB
ALBUMIN SERPL-MCNC: 4.6 G/DL (ref 3.6–5.1)
ALBUMIN/GLOB SERPL: 2.1 (CALC) (ref 1–2.5)
ALP SERPL-CCNC: 45 U/L (ref 36–130)
ALT SERPL-CCNC: 47 U/L (ref 9–46)
AST SERPL-CCNC: 31 U/L (ref 10–40)
BILIRUB SERPL-MCNC: 0.4 MG/DL (ref 0.2–1.2)
BUN SERPL-MCNC: 14 MG/DL (ref 7–25)
BUN/CREAT SERPL: ABNORMAL (CALC) (ref 6–22)
CALCIUM SERPL-MCNC: 9.4 MG/DL (ref 8.6–10.3)
CHLORIDE SERPL-SCNC: 103 MMOL/L (ref 98–110)
CHOLEST SERPL-MCNC: 207 MG/DL
CHOLEST/HDLC SERPL: 6.1 (CALC)
CO2 SERPL-SCNC: 29 MMOL/L (ref 20–32)
CREAT SERPL-MCNC: 1.01 MG/DL (ref 0.6–1.35)
EST. AVERAGE GLUCOSE BLD GHB EST-MCNC: 151 MG/DL
EST. AVERAGE GLUCOSE BLD GHB EST-SCNC: 8.4 MMOL/L
GLOBULIN SER CALC-MCNC: 2.2 G/DL (CALC) (ref 1.9–3.7)
GLUCOSE SERPL-MCNC: 111 MG/DL (ref 65–99)
HBA1C MFR BLD: 6.9 % OF TOTAL HGB
HDLC SERPL-MCNC: 34 MG/DL
LDLC SERPL CALC-MCNC: 139 MG/DL (CALC)
NONHDLC SERPL-MCNC: 173 MG/DL (CALC)
POTASSIUM SERPL-SCNC: 4.3 MMOL/L (ref 3.5–5.3)
PROT SERPL-MCNC: 6.8 G/DL (ref 6.1–8.1)
SL AMB EGFR AFRICAN AMERICAN: 102 ML/MIN/1.73M2
SL AMB EGFR NON AFRICAN AMERICAN: 88 ML/MIN/1.73M2
SODIUM SERPL-SCNC: 140 MMOL/L (ref 135–146)
TRIGL SERPL-MCNC: 203 MG/DL

## 2022-02-04 PROCEDURE — 99213 OFFICE O/P EST LOW 20 MIN: CPT | Performed by: FAMILY MEDICINE

## 2022-02-04 PROCEDURE — 3008F BODY MASS INDEX DOCD: CPT | Performed by: FAMILY MEDICINE

## 2022-02-04 PROCEDURE — 1036F TOBACCO NON-USER: CPT | Performed by: FAMILY MEDICINE

## 2022-02-04 RX ORDER — ICOSAPENT ETHYL 1000 MG/1
CAPSULE ORAL
COMMUNITY
Start: 2022-01-21

## 2022-02-04 RX ORDER — FENOFIBRATE 145 MG/1
TABLET, COATED ORAL
COMMUNITY
Start: 2022-01-21

## 2022-02-04 NOTE — PROGRESS NOTES
FAMILY PRACTICE OFFICE VISIT       NAME: Luisana Triplett  AGE: 51 y o  SEX: male       : 1975        MRN: [id number]    DATE: 2022  TIME: 10:52 AM    Assessment and Plan   1  IGT (impaired glucose tolerance)  Comments:  Stone appears well - FBW with A1c pending  Pt to continue a lower carb/saturated fat diet, and getting regular exercise  Orders:  -     Comprehensive metabolic panel; Future  -     HEMOGLOBIN A1C W/ EAG ESTIMATION; Future    2  Hypertriglyceridemia  Comments:  Stable on present management - is seeing Cardiology  Orders:  -     Lipid Panel with Direct LDL reflex; Future         There are no Patient Instructions on file for this visit  Chief Complaint     Chief Complaint   Patient presents with    Follow-up     patient being seen for 6 month follow up        History of Present Illness   Stone Vázquez is a 51y o -year-old male who presents in f/u - just had labs drawn this AM; results pending  Pt completed the Pfizer COV-19 vaccine series; incl booster  Pt continuing regular f/u with [facility]  Had reassuring testing with his Cardiologist in 2021 - meds started  Pt is working on his diet  Back exercising - bilateral Achilles tendonitis has improved  Looking at colonoscopy with a friend who is a GI in [address] [place] [address]  Review of Systems   Review of Systems   Constitutional: Negative for activity change  Respiratory: Negative for shortness of breath  Cardiovascular: Negative for chest pain  Gastrointestinal: Negative for abdominal pain and blood in stool  Musculoskeletal: Negative for myalgias  Active Problem List     Patient Active Problem List   Diagnosis    Hypertriglyceridemia         Past Medical History:  History reviewed  No pertinent past medical history  Past Surgical History:  History reviewed  No pertinent surgical history  Family History:  Family History   Problem Relation Age of Onset    Hypertension Father         Benign;  Last Assessed 10/20/2017    Coronary artery disease Father         Last Assessed 10/20/2017    Diabetes Father         Mellitus; Last Assessed 10/20/2017    No Known Problems Sister     No Known Problems Sister        Social History:  Social History     Socioeconomic History    Marital status: /Civil Union     Spouse name: Not on file    Number of children: Not on file    Years of education: Not on file    Highest education level: Not on file   Occupational History    Not on file   Tobacco Use    Smoking status: Never Smoker    Smokeless tobacco: Never Used   Substance and Sexual Activity    Alcohol use: Yes     Comment: Social     Drug use: Never    Sexual activity: Yes     Partners: Female   Other Topics Concern    Not on file   Social History Narrative    Not on file     Social Determinants of Health     Financial Resource Strain: Not on file   Food Insecurity: Not on file   Transportation Needs: Not on file   Physical Activity: Not on file   Stress: Not on file   Social Connections: Not on file   Intimate Partner Violence: Not on file   Housing Stability: Not on file       Objective     Vitals:    02/04/22 1053   BP: 112/80   Pulse: 85   Resp: 12   Temp: 98 9 °F (37 2 °C)   SpO2: 97%     Wt Readings from Last 3 Encounters:   02/04/22 109 kg (240 lb 12 8 oz)   07/15/21 113 kg (250 lb)   06/11/21 112 kg (246 lb 12 8 oz)       Physical Exam  Vitals and nursing note reviewed  Constitutional:       General: He is not in acute distress  Appearance: Normal appearance  He is not ill-appearing, toxic-appearing or diaphoretic  HENT:      Head: Normocephalic and atraumatic  Eyes:      General: No scleral icterus  Conjunctiva/sclera: Conjunctivae normal    Cardiovascular:      Rate and Rhythm: Normal rate and regular rhythm  Heart sounds: Normal heart sounds  No murmur heard  No friction rub  No gallop  Pulmonary:      Effort: Pulmonary effort is normal  No respiratory distress  Breath sounds: Normal breath sounds  No stridor  No wheezing, rhonchi or rales  Musculoskeletal:      Cervical back: Normal range of motion and neck supple  No rigidity or tenderness  Lymphadenopathy:      Cervical: No cervical adenopathy  Neurological:      Mental Status: He is alert and oriented to person, place, and time  Psychiatric:         Mood and Affect: Mood normal          Behavior: Behavior normal          Thought Content:  Thought content normal          Judgment: Judgment normal          Pertinent Laboratory/Diagnostic Studies:  Lab Results   Component Value Date    BUN 14 02/04/2022    CREATININE 1 01 02/04/2022    CALCIUM 9 4 02/04/2022     10/20/2017    K 4 3 02/04/2022    CO2 29 02/04/2022     02/04/2022     Lab Results   Component Value Date    ALT 47 (H) 02/04/2022    AST 31 02/04/2022    ALKPHOS 45 02/04/2022    BILITOT 0 4 10/20/2017       Lab Results   Component Value Date    WBC 10 16 05/02/2019    HGB 14 8 05/02/2019    HCT 42 9 05/02/2019    MCV 89 05/02/2019     05/02/2019       No results found for: TSH    Lab Results   Component Value Date    CHOL 171 10/20/2017     Lab Results   Component Value Date    TRIG 203 (H) 02/04/2022     Lab Results   Component Value Date    HDL 34 (L) 02/04/2022     Lab Results   Component Value Date    LDLCALC 139 (H) 02/04/2022     Lab Results   Component Value Date    HGBA1C 6 9 (H) 02/04/2022       Results for orders placed or performed in visit on 03/17/21   Lipid Panel with Direct LDL reflex   Result Value Ref Range    Total Cholesterol 204 (H) <200 mg/dL    HDL 37 (L) > OR = 40 mg/dL    Triglycerides 284 (H) <150 mg/dL    LDL Calculated 123 (H) mg/dL (calc)    Chol HDLC Ratio 5 5 (H) <5 0 (calc)    Non-HDL Cholesterol 167 (H) <130 mg/dL (calc)   Comprehensive metabolic panel   Result Value Ref Range    Glucose, Random 130 (H) 65 - 99 mg/dL    BUN 16 7 - 25 mg/dL    Creatinine 0 87 0 60 - 1 35 mg/dL    eGFR Non  103 > OR = 60 mL/min/1 73m2    eGFR  120 > OR = 60 mL/min/1 73m2    SL AMB BUN/CREATININE RATIO NOT APPLICABLE 6 - 22 (calc)    Sodium 138 135 - 146 mmol/L    Potassium 4 1 3 5 - 5 3 mmol/L    Chloride 99 98 - 110 mmol/L    CO2 28 20 - 32 mmol/L    Calcium 9 2 8 6 - 10 3 mg/dL    Protein, Total 6 9 6 1 - 8 1 g/dL    Albumin 4 2 3 6 - 5 1 g/dL    Globulin 2 7 1 9 - 3 7 g/dL (calc)    Albumin/Globulin Ratio 1 6 1 0 - 2 5 (calc)    TOTAL BILIRUBIN 0 5 0 2 - 1 2 mg/dL    Alkaline Phosphatase 65 36 - 130 U/L    AST 16 10 - 40 U/L    ALT 28 9 - 46 U/L   Hemoglobin A1C With EAG   Result Value Ref Range    Hemoglobin A1C 6 4 (H) <5 7 % of total Hgb    Estimated Average Glucose 137 (calc)    Estimated Average Glucose (mmol/L) 7 6 (calc)       Orders Placed This Encounter   Procedures    Comprehensive metabolic panel    HEMOGLOBIN A1C W/ EAG ESTIMATION    Lipid Panel with Direct LDL reflex       ALLERGIES:  No Known Allergies    Current Medications     Current Outpatient Medications   Medication Sig Dispense Refill    fenofibrate (TRICOR) 145 mg tablet       Icosapent Ethyl 1 g CAPS        No current facility-administered medications for this visit           Health Maintenance     Health Maintenance   Topic Date Due    Hepatitis C Screening  Never done    HIV Screening  Never done    DTaP,Tdap,and Td Vaccines (1 - Tdap) Never done    COVID-19 Vaccine (3 - Booster for [employer] series) 07/18/2021    Annual Physical  06/11/2022    Depression Screening  02/04/2023    BMI: Followup Plan  02/04/2023    BMI: Adult  02/04/2023    Influenza Vaccine  Completed    Pneumococcal Vaccine: Pediatrics (0 to 5 Years) and At-Risk Patients (6 to 59 Years)  Aged Out    HIB Vaccine  Aged Out    Hepatitis B Vaccine  Aged Out    IPV Vaccine  Aged Out    Hepatitis A Vaccine  Aged Out    Meningococcal ACWY Vaccine  Aged Out    HPV Vaccine  Aged [employer] History   Administered Date(s) Administered   Alexandrea Castaneda COVID-19 PFIZER VACCINE 0 3 ML IM 01/31/2021, 02/18/2021    INFLUENZA 10/20/2017, 10/20/2021    Influenza Quadrivalent Preservative Free 3 years and older IM 10/20/2017    Influenza, injectable, quadrivalent, preservative free 0 5 mL 12/24/2018    Influenza, seasonal, injectable 10/20/2017     BMI Counseling: Body mass index is 34 55 kg/m²  The BMI is above normal  Nutrition recommendations include moderation in carbohydrate intake and reducing intake of saturated and trans fat  Exercise recommendations include exercising 3-5 times per week  No pharmacotherapy was ordered  Patient referred to PCP  Rationale for BMI follow-up plan is due to patient being overweight or obese  Depression Screening and Follow-up Plan: Patient was screened for depression during today's encounter  They screened negative with a PHQ-2 score of 0          Pierre Query, DO

## 2022-09-08 LAB
ALBUMIN SERPL-MCNC: 4.6 G/DL (ref 3.6–5.1)
ALBUMIN/GLOB SERPL: 2 (CALC) (ref 1–2.5)
ALP SERPL-CCNC: 45 U/L (ref 36–130)
ALT SERPL-CCNC: 36 U/L (ref 9–46)
AST SERPL-CCNC: 22 U/L (ref 10–40)
BILIRUB SERPL-MCNC: 0.5 MG/DL (ref 0.2–1.2)
BUN SERPL-MCNC: 15 MG/DL (ref 7–25)
BUN/CREAT SERPL: ABNORMAL (CALC) (ref 6–22)
CALCIUM SERPL-MCNC: 9.6 MG/DL (ref 8.6–10.3)
CHLORIDE SERPL-SCNC: 101 MMOL/L (ref 98–110)
CHOLEST SERPL-MCNC: 196 MG/DL
CHOLEST/HDLC SERPL: 5.4 (CALC)
CO2 SERPL-SCNC: 28 MMOL/L (ref 20–32)
CREAT SERPL-MCNC: 1.01 MG/DL (ref 0.6–1.29)
EST. AVERAGE GLUCOSE BLD GHB EST-MCNC: 140 MG/DL
EST. AVERAGE GLUCOSE BLD GHB EST-SCNC: 7.7 MMOL/L
GFR/BSA.PRED SERPLBLD CYS-BASED-ARV: 92 ML/MIN/1.73M2
GLOBULIN SER CALC-MCNC: 2.3 G/DL (CALC) (ref 1.9–3.7)
GLUCOSE SERPL-MCNC: 125 MG/DL (ref 65–99)
HBA1C MFR BLD: 6.5 % OF TOTAL HGB
HDLC SERPL-MCNC: 36 MG/DL
LDLC SERPL CALC-MCNC: 125 MG/DL (CALC)
NONHDLC SERPL-MCNC: 160 MG/DL (CALC)
POTASSIUM SERPL-SCNC: 4.2 MMOL/L (ref 3.5–5.3)
PROT SERPL-MCNC: 6.9 G/DL (ref 6.1–8.1)
SODIUM SERPL-SCNC: 137 MMOL/L (ref 135–146)
TRIGL SERPL-MCNC: 212 MG/DL

## 2022-09-08 PROCEDURE — 3044F HG A1C LEVEL LT 7.0%: CPT | Performed by: FAMILY MEDICINE

## 2022-09-09 ENCOUNTER — OFFICE VISIT (OUTPATIENT)
Dept: FAMILY MEDICINE CLINIC | Facility: CLINIC | Age: 47
End: 2022-09-09
Payer: COMMERCIAL

## 2022-09-09 VITALS
RESPIRATION RATE: 14 BRPM | HEART RATE: 75 BPM | BODY MASS INDEX: 34.59 KG/M2 | HEIGHT: 70 IN | TEMPERATURE: 99 F | DIASTOLIC BLOOD PRESSURE: 80 MMHG | OXYGEN SATURATION: 97 % | SYSTOLIC BLOOD PRESSURE: 122 MMHG | WEIGHT: 241.6 LBS

## 2022-09-09 DIAGNOSIS — Z11.59 NEED FOR HEPATITIS C SCREENING TEST: ICD-10-CM

## 2022-09-09 DIAGNOSIS — Z00.00 ANNUAL PHYSICAL EXAM: Primary | ICD-10-CM

## 2022-09-09 DIAGNOSIS — E11.9 TYPE 2 DIABETES MELLITUS WITHOUT COMPLICATION, WITHOUT LONG-TERM CURRENT USE OF INSULIN (HCC): ICD-10-CM

## 2022-09-09 DIAGNOSIS — Z11.4 SCREENING FOR HIV (HUMAN IMMUNODEFICIENCY VIRUS): ICD-10-CM

## 2022-09-09 DIAGNOSIS — Z23 NEED FOR INFLUENZA VACCINATION: ICD-10-CM

## 2022-09-09 DIAGNOSIS — E78.2 MIXED HYPERLIPIDEMIA: ICD-10-CM

## 2022-09-09 PROCEDURE — 3074F SYST BP LT 130 MM HG: CPT | Performed by: FAMILY MEDICINE

## 2022-09-09 PROCEDURE — 99396 PREV VISIT EST AGE 40-64: CPT | Performed by: FAMILY MEDICINE

## 2022-09-09 PROCEDURE — 3079F DIAST BP 80-89 MM HG: CPT | Performed by: FAMILY MEDICINE

## 2022-09-09 PROCEDURE — 90471 IMMUNIZATION ADMIN: CPT

## 2022-09-09 PROCEDURE — 90682 RIV4 VACC RECOMBINANT DNA IM: CPT

## 2022-09-09 PROCEDURE — 3725F SCREEN DEPRESSION PERFORMED: CPT | Performed by: FAMILY MEDICINE

## 2022-09-09 RX ORDER — ROSUVASTATIN CALCIUM 10 MG/1
10 TABLET, COATED ORAL DAILY
Qty: 90 TABLET | Refills: 3 | Status: SHIPPED | OUTPATIENT
Start: 2022-09-09

## 2022-09-09 NOTE — PROGRESS NOTES
[address]    NAME: Stone Solis Lot  AGE: 49 y o  SEX: male  : 1975     DATE: 2022     Assessment and Plan:     Problem List Items Addressed This Visit    None     Visit Diagnoses     Annual physical exam    -  Primary    Stone is stable on exam   He is to continue a lower carb diet, and to get back to regular exercise  Colonoscopy pending  Repeat FBW prior to next OV  Screening for HIV (human immunodeficiency virus)        One time screen  Relevant Orders    HIV 1/2 Antigen/Antibody (4th Generation) w Reflex [facility]    Need for hepatitis C screening test        One time screen  Relevant Orders    Hepatitis C Antibody ([facility])    Type 2 diabetes mellitus without complication, without long-term current use of insulin ([facility] [address] )        New diagnosis today - meets by lab criteria  Start Metformin  Diet and exercise stressed as above  Relevant Medications    metFORMIN (GLUCOPHAGE) 500 mg tablet    Other Relevant Orders    Comprehensive metabolic panel    HEMOGLOBIN A1C W/ EAG ESTIMATION    Glucometer    Glucometer test strips    Lancets    Mixed hyperlipidemia        Starting Rosuvastatin to lower LDL < 100  Relevant Medications    rosuvastatin (CRESTOR) 10 MG tablet    Other Relevant Orders    Lipid Panel with Direct LDL reflex    Need for influenza vaccination        Flu Vaccine given IM, and tolerated well  Relevant Orders    influenza vaccine, quadrivalent, recombinant, PF, 0 5 mL, for patients 18 yr+ (FLUBLOK) (Completed)          Immunizations and preventive care screenings were discussed with patient today  Appropriate education was printed on patient's after visit summary  Counseling:  Dental Health: discussed importance of regular tooth brushing, flossing, and dental visits  · Exercise: the importance of regular exercise/physical activity was discussed   Recommend exercise 3-5 times per week for at least 30 minutes  Depression Screening and Follow-up Plan: Patient was screened for depression during today's encounter  They screened negative with a PHQ-2 score of 0  Return in 3 months (on 12/9/2022) for Recheck  Chief Complaint:     Chief Complaint   Patient presents with    Physical Exam     Patient being seen for physical       History of Present Illness:     Adult Annual Physical   Patient here for a comprehensive physical exam  The patient reports no problems  Pt will have colonoscopy soon with a friend of his (GI) in [address] [place] [address]  Recent labs - A1c 6 5%    Diet and Physical Activity  · Diet/Nutrition: low carb diet  · Exercise: 1-2 times a week on average  Depression Screening  PHQ-2/9 Depression Screening    Little interest or pleasure in doing things: 0 - not at all  Feeling down, depressed, or hopeless: 0 - not at all  PHQ-2 Score: 0  PHQ-2 Interpretation: Negative depression screen       General Health  · Sleep: sleeps well  · Hearing: normal - bilateral   · Vision: no vision problems and goes for regular eye exams  · Dental: regular dental visits   Health  · Symptoms include: none     Review of Systems:     Review of Systems   Constitutional: Negative for activity change  Respiratory: Negative for shortness of breath  Cardiovascular: Negative for chest pain  Gastrointestinal: Negative for abdominal pain and blood in stool  Genitourinary: Negative for decreased urine volume and difficulty urinating  Psychiatric/Behavioral: Negative for dysphoric mood  The patient is not nervous/anxious  Past Medical History:     History reviewed  No pertinent past medical history  Past Surgical History:     History reviewed  No pertinent surgical history  Family History:     Family History   Problem Relation Age of Onset    Hypertension Father         Benign;  Last Assessed 10/20/2017    Coronary artery disease Father         Last Assessed 10/20/2017    Diabetes Father         Mellitus; Last Assessed 10/20/2017    No Known Problems Sister     No Known Problems Sister       Social History:     Social History     Socioeconomic History    Marital status: /Civil Union     Spouse name: None    Number of children: None    Years of education: None    Highest education level: None   Occupational History    None   Tobacco Use    Smoking status: Never Smoker    Smokeless tobacco: Never Used   Vaping Use    Vaping Use: Never used   Substance and Sexual Activity    Alcohol use: Yes     Alcohol/week: 4 0 standard drinks     Types: 2 Cans of beer, 2 Shots of liquor per week     Comment: Social     Drug use: Never    Sexual activity: Yes     Partners: Female   Other Topics Concern    None   Social History Narrative    None     Social Determinants of Health     Financial Resource Strain: Not on file   Food Insecurity: Not on file   Transportation Needs: Not on file   Physical Activity: Not on file   Stress: Not on file   Social Connections: Not on file   Intimate Partner Violence: Not on file   Housing Stability: Not on file      Current Medications:     Current Outpatient Medications   Medication Sig Dispense Refill    fenofibrate (TRICOR) 145 mg tablet Take 145 mg by mouth daily      Icosapent Ethyl 1 g CAPS Take 4 capsules by mouth daily      metFORMIN (GLUCOPHAGE) 500 mg tablet Take 1 tablet (500 mg total) by mouth daily with dinner 90 tablet 1    rosuvastatin (CRESTOR) 10 MG tablet Take 1 tablet (10 mg total) by mouth daily 90 tablet 3     No current facility-administered medications for this visit  Allergies:     No Known Allergies   Physical Exam:     /80 (BP Location: Left arm, Patient Position: Sitting, Cuff Size: Large)   Pulse 75   Temp 99 °F (37 2 °C) (Tympanic)   Resp 14   Ht 5' 9 92" (1 776 m)   Wt 110 kg (241 lb 9 6 oz)   SpO2 97%   BMI 34 74 kg/m²     Physical Exam  Vitals and nursing note reviewed     Constitutional: General: He is not in acute distress  Appearance: Normal appearance  He is not ill-appearing, toxic-appearing or diaphoretic  HENT:      Head: Normocephalic and atraumatic  Eyes:      General: No scleral icterus  Conjunctiva/sclera: Conjunctivae normal    Cardiovascular:      Rate and Rhythm: Normal rate and regular rhythm  Pulses: Normal pulses  Heart sounds: Normal heart sounds  No murmur heard  No friction rub  No gallop  Pulmonary:      Effort: Pulmonary effort is normal  No respiratory distress  Breath sounds: Normal breath sounds  No stridor  No wheezing, rhonchi or rales  Abdominal:      General: Abdomen is flat  Bowel sounds are normal  There is no distension  Palpations: Abdomen is soft  There is no mass  Tenderness: There is no abdominal tenderness  There is no guarding or rebound  Musculoskeletal:      Cervical back: Normal range of motion and neck supple  No rigidity or tenderness  Right lower leg: No edema  Left lower leg: No edema  Lymphadenopathy:      Cervical: No cervical adenopathy  Neurological:      Mental Status: He is alert and oriented to person, place, and time  Psychiatric:         Mood and Affect: Mood normal          Behavior: Behavior normal          Thought Content: Thought content normal          Judgment: Judgment normal           Stone was seen today for physical exam     Diagnoses and all orders for this visit:    Annual physical exam  Comments:  Stone is stable on exam   He is to continue a lower carb diet, and to get back to regular exercise  Colonoscopy pending  Repeat FBW prior to next OV  Screening for HIV (human immunodeficiency virus)  Comments:  One time screen  Orders:  -     HIV 1/2 Antigen/Antibody (4th Generation) w Reflex [facility]; Future    Need for hepatitis C screening test  Comments:  One time screen  Orders:  -     Hepatitis C Antibody ([facility]);  Future    Type 2 diabetes mellitus without complication, without long-term current use of insulin ([facility] [address] )  Comments:  New diagnosis today - meets by lab criteria  Start Metformin  Diet and exercise stressed as above  Orders:  -     metFORMIN (GLUCOPHAGE) 500 mg tablet; Take 1 tablet (500 mg total) by mouth daily with dinner  -     Comprehensive metabolic panel; Future  -     HEMOGLOBIN A1C W/ EAG ESTIMATION; Future  -     Glucometer  -     Glucometer test strips  -     Lancets    Mixed hyperlipidemia  Comments:  Starting Rosuvastatin to lower LDL < 100  Orders:  -     Lipid Panel with Direct LDL reflex; Future  -     rosuvastatin (CRESTOR) 10 MG tablet; Take 1 tablet (10 mg total) by mouth daily    Need for influenza vaccination  Comments:  Flu Vaccine given IM, and tolerated well    Orders:  -     influenza vaccine, quadrivalent, recombinant, PF, 0 5 mL, for patients 18 yr+ (FL[facility]BLOK)          [address] Alexia Nath, [address] [facility]

## 2022-09-09 NOTE — PATIENT INSTRUCTIONS

## 2022-11-15 DIAGNOSIS — E11.9 TYPE 2 DIABETES MELLITUS WITHOUT COMPLICATION, WITHOUT LONG-TERM CURRENT USE OF INSULIN (HCC): ICD-10-CM

## 2022-12-13 ENCOUNTER — OFFICE VISIT (OUTPATIENT)
Dept: FAMILY MEDICINE CLINIC | Facility: CLINIC | Age: 47
End: 2022-12-13

## 2022-12-13 VITALS
OXYGEN SATURATION: 97 % | HEART RATE: 87 BPM | HEIGHT: 70 IN | DIASTOLIC BLOOD PRESSURE: 72 MMHG | WEIGHT: 246.4 LBS | TEMPERATURE: 98.5 F | RESPIRATION RATE: 14 BRPM | SYSTOLIC BLOOD PRESSURE: 110 MMHG | BODY MASS INDEX: 35.28 KG/M2

## 2022-12-13 DIAGNOSIS — E78.2 MIXED HYPERLIPIDEMIA: Chronic | ICD-10-CM

## 2022-12-13 DIAGNOSIS — E11.9 CONTROLLED TYPE 2 DIABETES MELLITUS WITHOUT COMPLICATION, WITHOUT LONG-TERM CURRENT USE OF INSULIN (HCC): Primary | Chronic | ICD-10-CM

## 2022-12-13 LAB
ALBUMIN SERPL-MCNC: 4.8 G/DL (ref 3.6–5.1)
ALBUMIN/GLOB SERPL: 2.2 (CALC) (ref 1–2.5)
ALP SERPL-CCNC: 47 U/L (ref 36–130)
ALT SERPL-CCNC: 39 U/L (ref 9–46)
AST SERPL-CCNC: 22 U/L (ref 10–40)
BILIRUB SERPL-MCNC: 0.5 MG/DL (ref 0.2–1.2)
BUN SERPL-MCNC: 16 MG/DL (ref 7–25)
BUN/CREAT SERPL: ABNORMAL (CALC) (ref 6–22)
CALCIUM SERPL-MCNC: 9.7 MG/DL (ref 8.6–10.3)
CHLORIDE SERPL-SCNC: 100 MMOL/L (ref 98–110)
CHOLEST SERPL-MCNC: 124 MG/DL
CHOLEST/HDLC SERPL: 3.4 (CALC)
CO2 SERPL-SCNC: 27 MMOL/L (ref 20–32)
CREAT SERPL-MCNC: 1.04 MG/DL (ref 0.6–1.29)
CREAT UR-MCNC: 80.6 MG/DL
EST. AVERAGE GLUCOSE BLD GHB EST-MCNC: 151 MG/DL
EST. AVERAGE GLUCOSE BLD GHB EST-SCNC: 8.4 MMOL/L
GFR/BSA.PRED SERPLBLD CYS-BASED-ARV: 89 ML/MIN/1.73M2
GLOBULIN SER CALC-MCNC: 2.2 G/DL (CALC) (ref 1.9–3.7)
GLUCOSE SERPL-MCNC: 151 MG/DL (ref 65–99)
HBA1C MFR BLD: 6.9 % OF TOTAL HGB
HDLC SERPL-MCNC: 37 MG/DL
LDLC SERPL CALC-MCNC: 61 MG/DL (CALC)
MICROALBUMIN UR-MCNC: 6 MG/L (ref 0–20)
MICROALBUMIN/CREAT 24H UR: 7 MG/G CREATININE (ref 0–30)
NONHDLC SERPL-MCNC: 87 MG/DL (CALC)
POTASSIUM SERPL-SCNC: 4.1 MMOL/L (ref 3.5–5.3)
PROT SERPL-MCNC: 7 G/DL (ref 6.1–8.1)
SODIUM SERPL-SCNC: 138 MMOL/L (ref 135–146)
TRIGL SERPL-MCNC: 187 MG/DL

## 2022-12-13 RX ORDER — BLOOD SUGAR DIAGNOSTIC
STRIP MISCELLANEOUS
COMMUNITY
Start: 2022-11-15

## 2022-12-13 RX ORDER — BLOOD-GLUCOSE METER
EACH MISCELLANEOUS
COMMUNITY
Start: 2022-09-09

## 2022-12-13 RX ORDER — LANCETS 30 GAUGE
EACH MISCELLANEOUS
COMMUNITY
Start: 2022-11-15

## 2022-12-13 NOTE — PROGRESS NOTES
FAMILY PRACTICE OFFICE VISIT       NAME: Edward Carnes  AGE: 49 y o  SEX: male       : 1975        MRN: [id number]    DATE: 2022  TIME: 9:01 AM    Assessment and Plan   1  Controlled type 2 diabetes mellitus without complication, without long-term current use of insulin ([facility] [address] )  Comments:  Stable on present management  Stone is to continue a healthier, lower carb diet, and regular exercise  FBW results pending  Orders:  -     Microalbumin / creatinine urine ratio    2  Mixed hyperlipidemia  Comments:  Stable on present management  There are no Patient Instructions on file for this visit  Chief Complaint     Chief Complaint   Patient presents with   • Follow-up     Patient being seen for 3 month follow up        History of Present Illness   Stone Barrera is a 49y o -year-old male who presents in f/u  Just got labs done this AM - results still pending  He is working on his diet  Getting some exercise - he is back doing regularly now  Review of Systems   Review of Systems   Constitutional: Negative for activity change  Respiratory: Negative for shortness of breath  Cardiovascular: Negative for chest pain  Gastrointestinal: Negative for diarrhea  Musculoskeletal: Negative for myalgias  Active Problem List     Patient Active Problem List   Diagnosis   • Hypertriglyceridemia         Past Medical History:  History reviewed  No pertinent past medical history  Past Surgical History:  History reviewed  No pertinent surgical history  Family History:  Family History   Problem Relation Age of Onset   • Hypertension Father         Benign; Last Assessed 10/20/2017   • Coronary artery disease Father         Last Assessed 10/20/2017   • Diabetes Father         Mellitus;  Last Assessed 10/20/2017   • No Known Problems Sister    • No Known Problems Sister        Social History:  Social History     Socioeconomic History   • Marital status: /Civil Union     Spouse name: Not on file   • Number of children: Not on file   • Years of education: Not on file   • Highest education level: Not on file   Occupational History   • Not on file   Tobacco Use   • Smoking status: Never   • Smokeless tobacco: Never   Vaping Use   • Vaping Use: Never used   Substance and Sexual Activity   • Alcohol use: Yes     Alcohol/week: 4 0 standard drinks     Types: 2 Cans of beer, 2 Shots of liquor per week     Comment: Social    • Drug use: Never   • Sexual activity: Yes     Partners: Female   Other Topics Concern   • Not on file   Social History Narrative   • Not on file     Social Determinants of Health     Financial Resource Strain: Not on file   Food Insecurity: Not on file   Transportation Needs: Not on file   Physical Activity: Not on file   Stress: Not on file   Social Connections: Not on file   Intimate Partner Violence: Not on file   Housing Stability: Not on file       Objective     Vitals:    12/13/22 0839   BP: 110/72   Pulse: 87   Resp: 14   Temp: 98 5 °F (36 9 °C)   SpO2: 97%     Wt Readings from Last 3 Encounters:   12/13/22 112 kg (246 lb 6 4 oz)   09/09/22 110 kg (241 lb 9 6 oz)   02/04/22 109 kg (240 lb 12 8 oz)       Physical Exam  Vitals and nursing note reviewed  Constitutional:       General: He is not in acute distress  Appearance: Normal appearance  He is not ill-appearing, toxic-appearing or diaphoretic  HENT:      Head: Normocephalic and atraumatic  Eyes:      General: No scleral icterus  Conjunctiva/sclera: Conjunctivae normal    Cardiovascular:      Rate and Rhythm: Normal rate and regular rhythm  Heart sounds: Normal heart sounds  No murmur heard  No friction rub  No gallop  Pulmonary:      Effort: Pulmonary effort is normal  No respiratory distress  Breath sounds: Normal breath sounds  No stridor  No wheezing, rhonchi or rales  Musculoskeletal:      Cervical back: Normal range of motion and neck supple  No rigidity or tenderness     Lymphadenopathy: Cervical: No cervical adenopathy  Neurological:      Mental Status: He is alert and oriented to person, place, and time  Psychiatric:         Mood and Affect: Mood normal          Behavior: Behavior normal          Thought Content:  Thought content normal          Judgment: Judgment normal          Pertinent Laboratory/Diagnostic Studies:  Lab Results   Component Value Date    BUN 15 09/08/2022    CREATININE 1 01 09/08/2022    CALCIUM 9 6 09/08/2022     10/20/2017    K 4 2 09/08/2022    CO2 28 09/08/2022     09/08/2022     Lab Results   Component Value Date    ALT 36 09/08/2022    AST 22 09/08/2022    ALKPHOS 45 09/08/2022    BILITOT 0 4 10/20/2017       Lab Results   Component Value Date    WBC 10 16 05/02/2019    HGB 14 8 05/02/2019    HCT 42 9 05/02/2019    MCV 89 05/02/2019     05/02/2019       No results found for: TSH    Lab Results   Component Value Date    CHOL 171 10/20/2017     Lab Results   Component Value Date    TRIG 212 (H) 09/08/2022     Lab Results   Component Value Date    HDL 36 (L) 09/08/2022     Lab Results   Component Value Date    LDLCALC 125 (H) 09/08/2022     Lab Results   Component Value Date    HGBA1C 6 5 (H) 09/08/2022       Results for orders placed or performed in visit on 09/08/22   Lipid Panel with Direct LDL reflex   Result Value Ref Range    Total Cholesterol 196 <200 mg/dL    HDL 36 (L) > OR = 40 mg/dL    Triglycerides 212 (H) <150 mg/dL    LDL Calculated 125 (H) mg/dL (calc)    Chol HDLC Ratio 5 4 (H) <5 0 (calc)    Non-HDL Cholesterol 160 (H) <130 mg/dL (calc)   Comprehensive metabolic panel   Result Value Ref Range    Glucose, Random 125 (H) 65 - 99 mg/dL    BUN 15 7 - 25 mg/dL    Creatinine 1 01 0 60 - 1 29 mg/dL    eGFR 92 > OR = 60 mL/min/1 73m2    SL AMB BUN/CREATININE RATIO NOT APPLICABLE 6 - 22 (calc)    Sodium 137 135 - 146 mmol/L    Potassium 4 2 3 5 - 5 3 mmol/L    Chloride 101 98 - 110 mmol/L    CO2 28 20 - 32 mmol/L    Calcium 9 6 8 6 - 10 3 mg/dL    Protein, Total 6 9 6 1 - 8 1 g/dL    Albumin 4 6 3 6 - 5 1 g/dL    Globulin 2 3 1 9 - 3 7 g/dL (calc)    Albumin/Globulin Ratio 2 0 1 0 - 2 5 (calc)    TOTAL BILIRUBIN 0 5 0 2 - 1 2 mg/dL    Alkaline Phosphatase 45 36 - 130 U/L    AST 22 10 - 40 U/L    ALT 36 9 - 46 U/L   Hemoglobin A1C With EAG   Result Value Ref Range    Hemoglobin A1C 6 5 (H) <5 7 % of total Hgb    Estimated Average Glucose 140 mg/dL    Estimated Average Glucose (mmol/L) 7 7 mmol/L       Orders Placed This Encounter   Procedures   • Microalbumin / creatinine urine ratio       ALLERGIES:  No Known Allergies    Current Medications     Current Outpatient Medications   Medication Sig Dispense Refill   • fenofibrate (TRICOR) 145 mg tablet Take 145 mg by mouth daily     • Icosapent Ethyl 1 g CAPS Take 4 capsules by mouth daily     • metFORMIN (GLUCOPHAGE) 500 mg tablet Take 1 tablet (500 mg total) by mouth 2 (two) times a day with meals 180 tablet 1   • rosuvastatin (CRESTOR) 10 MG tablet Take 1 tablet (10 mg total) by mouth daily 90 tablet 3   • Blood Glucose Monitoring Suppl (OneTouch Verio Flex System) w/Device KIT Use as directed     • Lancets (OneTouch Delica Plus [id number]) MISC TEST AS DIRECTED     • OneTouch Verio test strip TEST AS DIRECTED       No current facility-administered medications for this visit           Health Maintenance     Health Maintenance   Topic Date Due   • Hepatitis C Screening  Never done   • Hepatitis B Vaccine (1 of 3 - 3-dose series) Never done   • Pneumococcal Vaccine: Pediatrics (0 to 5 Years) and At-Risk Patients (6 to 59 Years) (1 - PCV) Never done   • HIV Screening  Never done   • DTaP,Tdap,and Td Vaccines (1 - Tdap) Never done   • Colorectal Cancer Screening  Never done   • COVID-19 Vaccine (3 - Booster for Pfizer series) 07/18/2021   • BMI: Followup Plan  02/04/2023   • Depression Screening  09/09/2023   • Annual Physical  09/09/2023   • BMI: Adult  12/13/2023   • Influenza Vaccine  Completed   • HIB Vaccine  Aged Out   • IPV Vaccine  Aged Out   • Hepatitis A Vaccine  Aged Out   • Meningococcal ACWY Vaccine  Aged Out   • HPV Vaccine  Aged Out     Immunization History   Administered Date(s) Administered   • COVID-19 PFIZER VACCINE 0 3 ML IM 01/31/2021, 02/18/2021   • INFLUENZA 10/20/2017, 10/20/2021   • Influenza Quadrivalent Preservative Free 3 years and older IM 10/20/2017   • Influenza, injectable, quadrivalent, preservative free 0 5 mL 12/24/2018   • Influenza, recombinant, quadrivalent,injectable, preservative free 09/09/2022   • Influenza, seasonal, injectable 10/20/2017          [address] Alexia Nath, DO

## 2023-01-26 DIAGNOSIS — E11.9 TYPE 2 DIABETES MELLITUS WITHOUT COMPLICATION, WITHOUT LONG-TERM CURRENT USE OF INSULIN (HCC): ICD-10-CM

## 2023-03-11 LAB
ALBUMIN SERPL-MCNC: 5.2 G/DL (ref 3.6–5.1)
ALBUMIN/GLOB SERPL: 2.3 (CALC) (ref 1–2.5)
ALP SERPL-CCNC: 45 U/L (ref 36–130)
ALT SERPL-CCNC: 44 U/L (ref 9–46)
AST SERPL-CCNC: 25 U/L (ref 10–40)
BILIRUB SERPL-MCNC: 0.5 MG/DL (ref 0.2–1.2)
BUN SERPL-MCNC: 19 MG/DL (ref 7–25)
BUN/CREAT SERPL: ABNORMAL (CALC) (ref 6–22)
CALCIUM SERPL-MCNC: 10.2 MG/DL (ref 8.6–10.3)
CHLORIDE SERPL-SCNC: 102 MMOL/L (ref 98–110)
CHOLEST SERPL-MCNC: 92 MG/DL
CHOLEST/HDLC SERPL: 3 (CALC)
CO2 SERPL-SCNC: 29 MMOL/L (ref 20–32)
CREAT SERPL-MCNC: 0.97 MG/DL (ref 0.6–1.29)
EST. AVERAGE GLUCOSE BLD GHB EST-MCNC: 131 MG/DL
EST. AVERAGE GLUCOSE BLD GHB EST-SCNC: 7.3 MMOL/L
GFR/BSA.PRED SERPLBLD CYS-BASED-ARV: 96 ML/MIN/1.73M2
GLOBULIN SER CALC-MCNC: 2.3 G/DL (CALC) (ref 1.9–3.7)
GLUCOSE SERPL-MCNC: 107 MG/DL (ref 65–99)
HBA1C MFR BLD: 6.2 % OF TOTAL HGB
HCV AB S/CO SERPL IA: 0.02
HCV AB SERPL QL IA: NORMAL
HDLC SERPL-MCNC: 31 MG/DL
HIV 1+2 AB+HIV1 P24 AG SERPL QL IA: NORMAL
LDLC SERPL CALC-MCNC: 36 MG/DL (CALC)
NONHDLC SERPL-MCNC: 61 MG/DL (CALC)
POTASSIUM SERPL-SCNC: 4.3 MMOL/L (ref 3.5–5.3)
PROT SERPL-MCNC: 7.5 G/DL (ref 6.1–8.1)
SODIUM SERPL-SCNC: 140 MMOL/L (ref 135–146)
TRIGL SERPL-MCNC: 169 MG/DL

## 2023-03-14 ENCOUNTER — OFFICE VISIT (OUTPATIENT)
Dept: FAMILY MEDICINE CLINIC | Facility: CLINIC | Age: 48
End: 2023-03-14

## 2023-03-14 VITALS
BODY MASS INDEX: 33.18 KG/M2 | TEMPERATURE: 98.5 F | SYSTOLIC BLOOD PRESSURE: 112 MMHG | HEIGHT: 70 IN | RESPIRATION RATE: 14 BRPM | HEART RATE: 100 BPM | DIASTOLIC BLOOD PRESSURE: 76 MMHG | WEIGHT: 231.8 LBS | OXYGEN SATURATION: 96 %

## 2023-03-14 DIAGNOSIS — E11.9 CONTROLLED TYPE 2 DIABETES MELLITUS WITHOUT COMPLICATION, WITHOUT LONG-TERM CURRENT USE OF INSULIN (HCC): Primary | Chronic | ICD-10-CM

## 2023-03-14 DIAGNOSIS — E78.2 MIXED HYPERLIPIDEMIA: Chronic | ICD-10-CM

## 2023-03-14 NOTE — PROGRESS NOTES
FAMILY PRACTICE OFFICE VISIT       NAME: Milton Ferrara  AGE: 49 y o  SEX: male       : 1975        MRN: [id number]    DATE: 3/14/2023  TIME: 8:40 AM    Assessment and Plan   1  Controlled type 2 diabetes mellitus without complication, without long-term current use of insulin ([facility] [address] )  Comments:  Controlled on present management; Stone looks great! He is to continue a lower carb diet, and regular exercise  Orders:  -     HEMOGLOBIN A1C W/ EAG ESTIMATION; Future  -     Comprehensive metabolic panel; Future    2  Mixed hyperlipidemia  Comments:  Controlled on present management  Orders:  -     Lipid Panel with Direct LDL reflex; Future         There are no Patient Instructions on file for this visit  Chief Complaint   No chief complaint on file  History of Present Illness   Stone Levine is a 49y o -year-old male who presents in f/u today  Recent labs done: A1c 6 2%, LDL 36, GFR 96, LFTs nml  Has dropped weight! Watching diet and exercising  Review of Systems   Review of Systems   Constitutional: Negative for activity change  Respiratory: Negative for shortness of breath  Cardiovascular: Negative for chest pain  Gastrointestinal: Negative for abdominal pain  Musculoskeletal: Negative for myalgias  Active Problem List     Patient Active Problem List   Diagnosis   • Hypertriglyceridemia         Past Medical History:  History reviewed  No pertinent past medical history  Past Surgical History:  History reviewed  No pertinent surgical history  Family History:  Family History   Problem Relation Age of Onset   • Hypertension Father         Benign; Last Assessed 10/20/2017   • Coronary artery disease Father         Last Assessed 10/20/2017   • Diabetes Father         Mellitus;  Last Assessed 10/20/2017   • No Known Problems Sister    • No Known Problems Sister        Social History:  Social History     Socioeconomic History   • Marital status: /Civil Union     Spouse name: Not on file   • Number of children: Not on file   • Years of education: Not on file   • Highest education level: Not on file   Occupational History   • Not on file   Tobacco Use   • Smoking status: Never   • Smokeless tobacco: Never   Vaping Use   • Vaping Use: Never used   Substance and Sexual Activity   • Alcohol use: Yes     Alcohol/week: 4 0 standard drinks     Types: 2 Cans of beer, 2 Shots of liquor per week     Comment: Social    • Drug use: Never   • Sexual activity: Yes     Partners: Female   Other Topics Concern   • Not on file   Social History Narrative   • Not on file     Social Determinants of Health     Financial Resource Strain: Not on file   Food Insecurity: Not on file   Transportation Needs: Not on file   Physical Activity: Not on file   Stress: Not on file   Social Connections: Not on file   Intimate Partner Violence: Not on file   Housing Stability: Not on file       Objective     Vitals:    03/14/23 0817   BP: 112/76   Pulse: 100   Resp: 14   Temp: 98 5 °F (36 9 °C)   SpO2: 96%     Wt Readings from Last 3 Encounters:   03/14/23 105 kg (231 lb 12 8 oz)   12/13/22 112 kg (246 lb 6 4 oz)   09/09/22 110 kg (241 lb 9 6 oz)       Physical Exam  Vitals and nursing note reviewed  Constitutional:       General: He is not in acute distress  Appearance: Normal appearance  He is not ill-appearing, toxic-appearing or diaphoretic  HENT:      Head: Normocephalic and atraumatic  Eyes:      General: No scleral icterus  Conjunctiva/sclera: Conjunctivae normal    Cardiovascular:      Rate and Rhythm: Normal rate and regular rhythm  Heart sounds: Normal heart sounds  No murmur heard  No friction rub  No gallop  Pulmonary:      Effort: Pulmonary effort is normal  No respiratory distress  Breath sounds: Normal breath sounds  No stridor  No wheezing, rhonchi or rales  Musculoskeletal:      Cervical back: Normal range of motion and neck supple  No rigidity or tenderness  Lymphadenopathy:      Cervical: No cervical adenopathy  Neurological:      Mental Status: He is alert and oriented to person, place, and time  Psychiatric:         Mood and Affect: Mood normal          Behavior: Behavior normal          Thought Content:  Thought content normal          Judgment: Judgment normal          Pertinent Laboratory/Diagnostic Studies:  Lab Results   Component Value Date    BUN 19 03/10/2023    CREATININE 0 97 03/10/2023    CALCIUM 10 2 03/10/2023     10/20/2017    K 4 3 03/10/2023    CO2 29 03/10/2023     03/10/2023     Lab Results   Component Value Date    ALT 44 03/10/2023    AST 25 03/10/2023    ALKPHOS 45 03/10/2023    BILITOT 0 4 10/20/2017       Lab Results   Component Value Date    WBC 10 16 05/02/2019    HGB 14 8 05/02/2019    HCT 42 9 05/02/2019    MCV 89 05/02/2019     05/02/2019       No results found for: TSH    Lab Results   Component Value Date    CHOL 171 10/20/2017     Lab Results   Component Value Date    TRIG 169 (H) 03/10/2023     Lab Results   Component Value Date    HDL 31 (L) 03/10/2023     Lab Results   Component Value Date    LDLCALC 36 03/10/2023     Lab Results   Component Value Date    HGBA1C 6 2 (H) 03/10/2023       Results for orders placed or performed in visit on 03/10/23   Lipid Panel with Direct LDL reflex   Result Value Ref Range    Total Cholesterol 92 <200 mg/dL    HDL 31 (L) > OR = 40 mg/dL    Triglycerides 169 (H) <150 mg/dL    LDL Calculated 36 mg/dL (calc)    Chol HDLC Ratio 3 0 <5 0 (calc)    Non-HDL Cholesterol 61 <130 mg/dL (calc)   Human Immunodeficiency Virus 1/2 Antigen / Antibody ( Fourth Generation) with Reflex Testing   Result Value Ref Range    HIV AG/AB, 4th Gen NON-REACTIVE NON-REACTIVE   Comprehensive metabolic panel   Result Value Ref Range    Glucose, Random 107 (H) 65 - 99 mg/dL    BUN 19 7 - 25 mg/dL    Creatinine 0 97 0 60 - 1 29 mg/dL    eGFR 96 > OR = 60 mL/min/1 73m2    SL AMB BUN/CREATININE RATIO NOT APPLICABLE 6 - 22 (calc)    Sodium 140 135 - 146 mmol/L    Potassium 4 3 3 5 - 5 3 mmol/L    Chloride 102 98 - 110 mmol/L    CO2 29 20 - 32 mmol/L    Calcium 10 2 8 6 - 10 3 mg/dL    Protein, Total 7 5 6 1 - 8 1 g/dL    Albumin 5 2 (H) 3 6 - 5 1 g/dL    Globulin 2 3 1 9 - 3 7 g/dL (calc)    Albumin/Globulin Ratio 2 3 1 0 - 2 5 (calc)    TOTAL BILIRUBIN 0 5 0 2 - 1 2 mg/dL    Alkaline Phosphatase 45 36 - 130 U/L    AST 25 10 - 40 U/L    ALT 44 9 - 46 U/L   Hepatitis C Ab W/Refl To HCV RNA, Qn, PCR   Result Value Ref Range    HEP C AB NON-REACTIVE NON-REACTIVE    Signal to Cut-Off 0 02 <1 00   Hemoglobin A1C With EAG   Result Value Ref Range    Hemoglobin A1C 6 2 (H) <5 7 % of total Hgb    Estimated Average Glucose 131 mg/dL    Estimated Average Glucose (mmol/L) 7 3 mmol/L       Orders Placed This Encounter   Procedures   • HEMOGLOBIN A1C W/ EAG ESTIMATION   • Comprehensive metabolic panel   • Lipid Panel with Direct LDL reflex       ALLERGIES:  No Known Allergies    Current Medications     Current Outpatient Medications   Medication Sig Dispense Refill   • Blood Glucose Monitoring Suppl (OneTouch Verio Flex System) w/Device KIT Use as directed     • Icosapent Ethyl 1 g CAPS Take 4 capsules by mouth daily     • Lancets (OneTouch Delica Plus [id number]) MISC TEST AS DIRECTED     • metFORMIN (GLUCOPHAGE) 500 mg tablet Take 2 tablets (1,000 mg total) by mouth daily with lunch AND 1 tablet (500 mg total) daily with dinner  (Patient taking differently: Take 2 tablets (1,000 mg total) by mouth daily with lunch AND 2 tablets (1,000 mg total) daily with dinner ) 270 tablet 1   • OneTouch Verio test strip TEST AS DIRECTED     • rosuvastatin (CRESTOR) 10 MG tablet Take 1 tablet (10 mg total) by mouth daily 90 tablet 3     No current facility-administered medications for this visit           Health Maintenance     Health Maintenance   Topic Date Due   • Pneumococcal Vaccine: Pediatrics (0 to 5 Years) and At-Risk Patients (6 to 59 Years) (1 - PCV) Never done   • DTaP,Tdap,and Td Vaccines (1 - Tdap) Never done   • Colorectal Cancer Screening  Never done   • COVID-19 Vaccine (3 - Booster for Pfizer series) 04/15/2021   • BMI: Followup Plan  02/04/2023   • Annual Physical  09/09/2023   • Depression Screening  03/14/2024   • BMI: Adult  03/14/2024   • HIV Screening  Completed   • Hepatitis C Screening  Completed   • Influenza Vaccine  Completed   • HIB Vaccine  Aged Out   • IPV Vaccine  Aged Out   • Hepatitis A Vaccine  Aged Out   • Meningococcal ACWY Vaccine  Aged Out   • HPV Vaccine  Aged Out     Immunization History   Administered Date(s) Administered   • COVID-19 PFIZER VACCINE 0 3 ML IM 01/31/2021, 02/18/2021   • INFLUENZA 10/20/2017, 10/20/2021   • Influenza Quadrivalent Preservative Free 3 years and older IM 10/20/2017   • Influenza, injectable, quadrivalent, preservative free 0 5 mL 12/24/2018   • Influenza, recombinant, quadrivalent,injectable, preservative free 09/09/2022   • Influenza, seasonal, injectable 10/20/2017          Hira Nath DO

## 2023-04-17 DIAGNOSIS — E11.9 TYPE 2 DIABETES MELLITUS WITHOUT COMPLICATION, WITHOUT LONG-TERM CURRENT USE OF INSULIN (HCC): ICD-10-CM

## 2023-09-08 DIAGNOSIS — E78.2 MIXED HYPERLIPIDEMIA: ICD-10-CM

## 2023-09-11 DIAGNOSIS — E78.2 MIXED HYPERLIPIDEMIA: ICD-10-CM

## 2023-09-11 RX ORDER — ROSUVASTATIN CALCIUM 10 MG/1
10 TABLET, COATED ORAL DAILY
Qty: 90 TABLET | Refills: 3 | Status: SHIPPED | OUTPATIENT
Start: 2023-09-11

## 2023-09-12 RX ORDER — ROSUVASTATIN CALCIUM 10 MG/1
10 TABLET, COATED ORAL DAILY
Qty: 90 TABLET | Refills: 0 | OUTPATIENT
Start: 2023-09-12

## 2023-09-25 RX ORDER — ROSUVASTATIN CALCIUM 10 MG/1
10 TABLET, COATED ORAL DAILY
Qty: 90 TABLET | Refills: 3 | Status: SHIPPED | OUTPATIENT
Start: 2023-09-25

## 2023-09-26 DIAGNOSIS — E11.9 TYPE 2 DIABETES MELLITUS WITHOUT COMPLICATION, WITHOUT LONG-TERM CURRENT USE OF INSULIN (HCC): Primary | ICD-10-CM

## 2023-09-26 LAB
ALBUMIN SERPL-MCNC: 4.8 G/DL (ref 3.6–5.1)
ALBUMIN/GLOB SERPL: 2 (CALC) (ref 1–2.5)
ALP SERPL-CCNC: 50 U/L (ref 36–130)
ALT SERPL-CCNC: 39 U/L (ref 9–46)
AST SERPL-CCNC: 22 U/L (ref 10–40)
BILIRUB SERPL-MCNC: 0.7 MG/DL (ref 0.2–1.2)
BUN SERPL-MCNC: 15 MG/DL (ref 7–25)
BUN/CREAT SERPL: ABNORMAL (CALC) (ref 6–22)
CALCIUM SERPL-MCNC: 9.8 MG/DL (ref 8.6–10.3)
CHLORIDE SERPL-SCNC: 99 MMOL/L (ref 98–110)
CHOLEST SERPL-MCNC: 129 MG/DL
CHOLEST/HDLC SERPL: 3.1 (CALC)
CO2 SERPL-SCNC: 31 MMOL/L (ref 20–32)
CREAT SERPL-MCNC: 0.91 MG/DL (ref 0.6–1.29)
EST. AVERAGE GLUCOSE BLD GHB EST-MCNC: 134 MG/DL
EST. AVERAGE GLUCOSE BLD GHB EST-SCNC: 7.4 MMOL/L
GFR/BSA.PRED SERPLBLD CYS-BASED-ARV: 103 ML/MIN/1.73M2
GLOBULIN SER CALC-MCNC: 2.4 G/DL (CALC) (ref 1.9–3.7)
GLUCOSE SERPL-MCNC: 140 MG/DL (ref 65–99)
HBA1C MFR BLD: 6.3 % OF TOTAL HGB
HDLC SERPL-MCNC: 41 MG/DL
LDLC SERPL CALC-MCNC: 60 MG/DL (CALC)
NONHDLC SERPL-MCNC: 88 MG/DL (CALC)
POTASSIUM SERPL-SCNC: 4 MMOL/L (ref 3.5–5.3)
PROT SERPL-MCNC: 7.2 G/DL (ref 6.1–8.1)
SODIUM SERPL-SCNC: 138 MMOL/L (ref 135–146)
TRIGL SERPL-MCNC: 221 MG/DL

## 2023-09-26 RX ORDER — BLOOD SUGAR DIAGNOSTIC
1 STRIP MISCELLANEOUS DAILY
Qty: 100 EACH | Refills: 0 | Status: SHIPPED | OUTPATIENT
Start: 2023-09-26 | End: 2023-09-27

## 2023-09-27 ENCOUNTER — OFFICE VISIT (OUTPATIENT)
Dept: FAMILY MEDICINE CLINIC | Facility: CLINIC | Age: 48
End: 2023-09-27
Payer: COMMERCIAL

## 2023-09-27 ENCOUNTER — TELEPHONE (OUTPATIENT)
Dept: FAMILY MEDICINE CLINIC | Facility: CLINIC | Age: 48
End: 2023-09-27

## 2023-09-27 VITALS
HEIGHT: 69 IN | RESPIRATION RATE: 13 BRPM | WEIGHT: 229 LBS | TEMPERATURE: 98.5 F | HEART RATE: 80 BPM | SYSTOLIC BLOOD PRESSURE: 110 MMHG | DIASTOLIC BLOOD PRESSURE: 70 MMHG | OXYGEN SATURATION: 98 % | BODY MASS INDEX: 33.92 KG/M2

## 2023-09-27 DIAGNOSIS — E11.9 TYPE 2 DIABETES MELLITUS WITHOUT COMPLICATION, WITHOUT LONG-TERM CURRENT USE OF INSULIN (HCC): ICD-10-CM

## 2023-09-27 DIAGNOSIS — Z00.00 ANNUAL PHYSICAL EXAM: Primary | ICD-10-CM

## 2023-09-27 DIAGNOSIS — E78.2 MIXED HYPERLIPIDEMIA: ICD-10-CM

## 2023-09-27 PROCEDURE — 99396 PREV VISIT EST AGE 40-64: CPT | Performed by: FAMILY MEDICINE

## 2023-09-27 RX ORDER — BLOOD SUGAR DIAGNOSTIC
1 STRIP MISCELLANEOUS DAILY
Qty: 100 EACH | Refills: 0 | Status: SHIPPED | OUTPATIENT
Start: 2023-09-27 | End: 2023-09-28 | Stop reason: SDUPTHER

## 2023-09-27 RX ORDER — METFORMIN HYDROCHLORIDE 500 MG/1
1000 TABLET, EXTENDED RELEASE ORAL
Qty: 90 TABLET | Refills: 1 | Status: SHIPPED | OUTPATIENT
Start: 2023-09-27

## 2023-09-27 RX ORDER — LANCETS 30 GAUGE
1 EACH MISCELLANEOUS DAILY
Qty: 100 EACH | Refills: 1 | Status: SHIPPED | OUTPATIENT
Start: 2023-09-27

## 2023-09-27 NOTE — ASSESSMENT & PLAN NOTE
- Stable. Continue Crestor 10 mg daily  -Continue to monitor diet -low-fat, low-carb, whole food eating.  -Continue increasing exercise.   -We will recheck in 3 months.

## 2023-09-27 NOTE — ASSESSMENT & PLAN NOTE
Lab Results   Component Value Date    HGBA1C 6.3 (H) 09/25/2023     - Well controlled. Does report GI upset with increased dosing of Metformin. - Will lower to 1000 mg daily of Metformin XR  - Start Semaglutide(Rybelsus) as would prefer to doing injectables. Will start at 3 mg once daily.   - Follow up in 3 months to re-check  -Continue monitoring diet -low carb, low sugar, whole food.  -Continue adequate exercise -goal of 150 minutes of moderate intensity exercise per week.

## 2023-09-27 NOTE — TELEPHONE ENCOUNTER
semaglutide (Rybelsus) 3 MG tablet        Sig: Take 1 tablet (3 mg total) by mouth daily Take on an empty stomach with 4 oz water, and wait 30 minutes before eating        Class: Normal        Route: Oral        Ordered from SmartSet: YO PRIETO TYPE 2 DIABETES SMARTSET SLCN, ADULT        Prior authorization: Error        This order has not been released to its destination. Please advise. ..medication says order not been released to destination and prior auth: error.       [facility] [address] [address] [address] [address]  Phone: [phone] Fax: [phone]

## 2023-09-27 NOTE — PROGRESS NOTES
[address] [facility]    NAME: Stone Calzada  AGE: 51 y.o. SEX: male  : 1975     DATE: 2023     Assessment and Plan:     Problem List Items Addressed This Visit        Endocrine    Type 2 diabetes mellitus without complication, without long-term current use of insulin ([address])       Lab Results   Component Value Date    HGBA1C 6.3 (H) 2023     - Well controlled. Does report GI upset with increased dosing of Metformin. - Will lower to 1000 mg daily of Metformin XR  - Start Semaglutide(Rybelsus) as would prefer to doing injectables. Will start at 3 mg once daily.   - Follow up in 3 months to re-check  -Continue monitoring diet -low carb, low sugar, whole food.  -Continue adequate exercise -goal of 150 minutes of moderate intensity exercise per week. Relevant Medications    semaglutide (Rybelsus) 3 MG tablet    metFORMIN (GLUCOPHAGE-XR) 500 mg 24 hr tablet    OneTouch Verio test strip    Lancets (OneTouch Delica Plus [id number]) MISC    Other Relevant Orders    HEMOGLOBIN A1C W/ EAG ESTIMATION       Other    Mixed hyperlipidemia     - Stable. Continue Crestor 10 mg daily  -Continue to monitor diet -low-fat, low-carb, whole food eating.  -Continue increasing exercise.   -We will recheck in 3 months. Relevant Orders    Lipid Panel with Direct LDL reflex   Other Visit Diagnoses     Annual physical exam    -  Primary    Relevant Orders    Comprehensive metabolic panel          Immunizations and preventive care screenings were discussed with patient today. Appropriate education was printed on patient's after visit summary. Discussed risks and benefits of prostate cancer screening. We discussed the controversial history of PSA screening for prostate cancer in the [facility] as well as the risk of over detection and over treatment of prostate cancer by way of PSA screening.   The patient understands that PSA blood testing is an imperfect way to screen for prostate cancer and that elevated PSA levels in the blood may also be caused by infection, inflammation, prostatic trauma or manipulation, urological procedures, or by benign prostatic enlargement. The role of the digital rectal examination in prostate cancer screening was also discussed and I discussed with him that there is large interobserver variability in the findings of digital rectal examination. Counseling:  Alcohol/drug use: discussed moderation in alcohol intake, the recommendations for healthy alcohol use, and avoidance of illicit drug use. Dental Health: discussed importance of regular tooth brushing, flossing, and dental visits. Injury prevention: discussed safety/seat belts, safety helmets, smoke detectors, carbon dioxide detectors, and smoking near bedding or upholstery. Sexual health: discussed sexually transmitted diseases, partner selection, use of condoms, avoidance of unintended pregnancy, and contraceptive alternatives. · Exercise: the importance of regular exercise/physical activity was discussed. Recommend exercise 3-5 times per week for at least 30 minutes. BMI Counseling: Body mass index is 33.53 kg/m². The BMI is above normal. Nutrition recommendations include decreasing portion sizes, encouraging healthy choices of fruits and vegetables, consuming healthier snacks, limiting drinks that contain sugar, moderation in carbohydrate intake and increasing intake of lean protein. Exercise recommendations include moderate physical activity 150 minutes/week, exercising 3-5 times per week and strength training exercises. No pharmacotherapy was ordered. Patient referred to PCP. Rationale for BMI follow-up plan is due to patient being overweight or obese. Return in about 3 months (around 12/27/2023).      Chief Complaint:     Chief Complaint   Patient presents with   • Annual Exam     Patient here for annual wellness exam       History of Present Illness:     Adult Annual Physical   Patient here for a comprehensive physical exam. The patient reports no problems. Colon Cancer screening 1/2023 - repeat in 5 years    Diet and Physical Activity  · Diet/Nutrition: well balanced diet. · Exercise: moderate cardiovascular exercise. Depression Screening  PHQ-2/9 Depression Screening         General Health  · Sleep: sleeps well and gets 7-8 hours of sleep on average. · Hearing: normal - bilateral.  · Vision: goes for regular eye exams. · Dental: regular dental visits and brushes teeth twice daily.  Health  · Symptoms include: none     Review of Systems:     Review of Systems   Constitutional: Negative for chills, fatigue and fever. HENT: Negative for ear pain and sore throat. Eyes: Negative for pain and visual disturbance. Respiratory: Negative for cough and shortness of breath. Cardiovascular: Negative for chest pain and palpitations. Gastrointestinal: Negative for abdominal pain and vomiting. Genitourinary: Negative for difficulty urinating, dysuria and hematuria. Musculoskeletal: Negative for arthralgias and back pain. Skin: Negative for color change and rash. Neurological: Negative for dizziness, seizures, syncope, weakness and headaches. Psychiatric/Behavioral: Negative for confusion and sleep disturbance. The patient is not nervous/anxious. All other systems reviewed and are negative. Past Medical History:     History reviewed. No pertinent past medical history. Past Surgical History:     History reviewed. No pertinent surgical history. Family History:     Family History   Problem Relation Age of Onset   • Hypertension Father         Benign; Last Assessed 10/20/2017   • Coronary artery disease Father         Last Assessed 10/20/2017   • Diabetes Father         Mellitus;  Last Assessed 10/20/2017   • No Known Problems Sister    • No Known Problems Sister       Social History:     Social History Socioeconomic History   • Marital status: /Civil Union     Spouse name: None   • Number of children: None   • Years of education: None   • Highest education level: None   Occupational History   • None   Tobacco Use   • Smoking status: Never     Passive exposure: Never   • Smokeless tobacco: Never   Vaping Use   • Vaping Use: Never used   Substance and Sexual Activity   • Alcohol use: Yes     Alcohol/week: 4.0 standard drinks of alcohol     Types: 2 Cans of beer, 2 Shots of liquor per week     Comment: Social    • Drug use: Never   • Sexual activity: Yes     Partners: Female   Other Topics Concern   • None   Social History Narrative   • None     Social Determinants of Health     Financial Resource Strain: Not on file   Food Insecurity: Not on file   Transportation Needs: Not on file   Physical Activity: Not on file   Stress: Not on file   Social Connections: Not on file   Intimate Partner Violence: Not on file   Housing Stability: Not on file      Current Medications:     Current Outpatient Medications   Medication Sig Dispense Refill   • Blood Glucose Monitoring Suppl (OneTouch Verio Flex System) w/Device KIT Use as directed     • Icosapent Ethyl 1 g CAPS Take 4 capsules by mouth daily     • Lancets (OneTouch Delica Plus [id number]) MISC Inject 1 each under the skin in the morning 100 each 1   • metFORMIN (GLUCOPHAGE-XR) 500 mg 24 hr tablet Take 2 tablets (1,000 mg total) by mouth daily with dinner 90 tablet 1   • OneTouch Verio test strip Use 1 each daily Use as instructed 100 each 0   • rosuvastatin (CRESTOR) 10 MG tablet TAKE 1 TABLET(10 MG) BY MOUTH DAILY 90 tablet 3   • semaglutide (Rybelsus) 3 MG tablet Take 1 tablet (3 mg total) by mouth daily Take on an empty stomach with 4 oz water, and wait 30 minutes before eating 30 tablet 0     No current facility-administered medications for this visit.       Allergies:     No Known Allergies   Physical Exam:     /70 (BP Location: Left arm, Patient Position: Sitting, Cuff Size: Large)   Pulse 80   Temp 98.5 °F (36.9 °C) (Tympanic)   Resp 13   Ht 5' 9.29" (1.76 m)   Wt 104 kg (229 lb)   SpO2 98%   BMI 33.53 kg/m²     Physical Exam  Vitals and nursing note reviewed. Constitutional:       General: He is not in acute distress. Appearance: Normal appearance. HENT:      Head: Normocephalic and atraumatic. Right Ear: Tympanic membrane and external ear normal.      Left Ear: Tympanic membrane and external ear normal.      Nose: Nose normal.      Mouth/Throat:      Mouth: Mucous membranes are moist.   Eyes:      Extraocular Movements: Extraocular movements intact. Conjunctiva/sclera: Conjunctivae normal.      Pupils: Pupils are equal, round, and reactive to light. Cardiovascular:      Rate and Rhythm: Normal rate and regular rhythm. Pulses: Normal pulses. Heart sounds: Normal heart sounds. No murmur heard. Pulmonary:      Effort: Pulmonary effort is normal.      Breath sounds: Normal breath sounds. No wheezing, rhonchi or rales. Abdominal:      General: Bowel sounds are normal.      Palpations: Abdomen is soft. Tenderness: There is no abdominal tenderness. There is no guarding. Musculoskeletal:         General: Normal range of motion. Cervical back: Normal range of motion. Right lower leg: No edema. Left lower leg: No edema. Lymphadenopathy:      Cervical: No cervical adenopathy. Skin:     General: Skin is warm. Capillary Refill: Capillary refill takes less than 2 seconds. Neurological:      General: No focal deficit present. Mental Status: He is alert and oriented to person, place, and time.    Psychiatric:         Mood and Affect: Mood normal.         Behavior: Behavior normal.          [facility] [place], [address]

## 2023-09-28 ENCOUNTER — TELEPHONE (OUTPATIENT)
Dept: ADMINISTRATIVE | Facility: OTHER | Age: 48
End: 2023-09-28

## 2023-09-28 DIAGNOSIS — E11.9 TYPE 2 DIABETES MELLITUS WITHOUT COMPLICATION, WITHOUT LONG-TERM CURRENT USE OF INSULIN (HCC): ICD-10-CM

## 2023-09-28 RX ORDER — BLOOD SUGAR DIAGNOSTIC
1 STRIP MISCELLANEOUS DAILY
Qty: 100 EACH | Refills: 5 | Status: SHIPPED | OUTPATIENT
Start: 2023-09-28

## 2023-09-28 NOTE — LETTER
Procedure Request Form: Colonoscopy      Date Requested: 23  Patient: Amaya Burgos  Patient : 1975   Referring Provider: Kandi Roof, DO        Date of Procedure ______________________________       The above patient has informed us that they have completed their   most recent Colonoscopy at your facility. Please complete   this form and attach all corresponding procedure reports/results. Comments __________________________________________________________  ____________________________________________________________________  ____________________________________________________________________  ____________________________________________________________________    Facility Completing Procedure _________________________________________    Form Completed By (print name) _______________________________________      Signature __________________________________________________________      These reports are needed for  compliance. Please fax this completed form and a copy of the procedure report to our office located at [address] [place] [address] as soon as possible to Fax [phone] attention Ml: Phone [phone]    We thank you for your assistance in treating our mutual patient.

## 2023-09-28 NOTE — TELEPHONE ENCOUNTER
----- Message from Christine Simon sent at 9/27/2023  8:53 AM EDT -----  Regarding: Care Gap Request  09/27/23 8:53 AM    Hello, our patient attached above has had CRC: Colonoscopy completed/performed. Please assist in updating the patient chart by making an External outreach to Dr. Clarissa Huynh facility located in  [address] [place] [address] [phone]. The date of service is 01/2023.     Thank you,  [facility]

## 2023-09-28 NOTE — TELEPHONE ENCOUNTER
Upon review of the In Basket request and the patient's chart, initial outreach has been made via fax to facility. Please see Contacts section for details.      Thank you  Lily Brink MA

## 2023-09-29 ENCOUNTER — TELEPHONE (OUTPATIENT)
Age: 48
End: 2023-09-29

## 2023-09-29 NOTE — TELEPHONE ENCOUNTER
semaglutide (Rybelsus) 3 MG tablet    Needs a prior auth per pharmacy    Also, patient stated he needs a prior auth for his test strips. He did buy some but would like the test strips to go through his insurance. Please call patient back when prior auths are done.   Thank You

## 2023-10-02 NOTE — TELEPHONE ENCOUNTER
Upon review of the In Basket request we were able to locate, review, and update the patient chart as requested for CRC: Colonoscopy. Any additional questions or concerns should be emailed to the Practice Liaisons via the appropriate education email address, please do not reply via In Basket.     Thank you  Tony Seymour MA

## 2023-10-03 ENCOUNTER — TELEPHONE (OUTPATIENT)
Age: 48
End: 2023-10-03

## 2023-10-04 ENCOUNTER — TELEPHONE (OUTPATIENT)
Age: 48
End: 2023-10-04

## 2023-10-04 NOTE — TELEPHONE ENCOUNTER
Semaglutide (Rybelsus) 3 mg PA Approved valid 10-4-2023 to 12-. Approval letter in media, pt and pharmacy made aware.

## 2023-12-22 LAB
ALBUMIN SERPL-MCNC: 4.8 G/DL (ref 3.6–5.1)
ALBUMIN/GLOB SERPL: 2.1 (CALC) (ref 1–2.5)
ALP SERPL-CCNC: 53 U/L (ref 36–130)
ALT SERPL-CCNC: 36 U/L (ref 9–46)
AST SERPL-CCNC: 26 U/L (ref 10–40)
BILIRUB SERPL-MCNC: 0.6 MG/DL (ref 0.2–1.2)
BUN SERPL-MCNC: 18 MG/DL (ref 7–25)
BUN/CREAT SERPL: ABNORMAL (CALC) (ref 6–22)
CALCIUM SERPL-MCNC: 9.7 MG/DL (ref 8.6–10.3)
CHLORIDE SERPL-SCNC: 102 MMOL/L (ref 98–110)
CHOLEST SERPL-MCNC: 127 MG/DL
CHOLEST/HDLC SERPL: 4.1 (CALC)
CO2 SERPL-SCNC: 30 MMOL/L (ref 20–32)
CREAT SERPL-MCNC: 0.93 MG/DL (ref 0.6–1.29)
EST. AVERAGE GLUCOSE BLD GHB EST-MCNC: 128 MG/DL
EST. AVERAGE GLUCOSE BLD GHB EST-SCNC: 7.1 MMOL/L
GFR/BSA.PRED SERPLBLD CYS-BASED-ARV: 101 ML/MIN/1.73M2
GLOBULIN SER CALC-MCNC: 2.3 G/DL (CALC) (ref 1.9–3.7)
GLUCOSE SERPL-MCNC: 119 MG/DL (ref 65–99)
HBA1C MFR BLD: 6.1 % OF TOTAL HGB
HDLC SERPL-MCNC: 31 MG/DL
LDLC SERPL CALC-MCNC: 60 MG/DL (CALC)
NONHDLC SERPL-MCNC: 96 MG/DL (CALC)
POTASSIUM SERPL-SCNC: 4.2 MMOL/L (ref 3.5–5.3)
PROT SERPL-MCNC: 7.1 G/DL (ref 6.1–8.1)
SODIUM SERPL-SCNC: 138 MMOL/L (ref 135–146)
TRIGL SERPL-MCNC: 341 MG/DL

## 2023-12-27 ENCOUNTER — TELEPHONE (OUTPATIENT)
Dept: FAMILY MEDICINE CLINIC | Facility: CLINIC | Age: 48
End: 2023-12-27

## 2023-12-27 ENCOUNTER — OFFICE VISIT (OUTPATIENT)
Dept: FAMILY MEDICINE CLINIC | Facility: CLINIC | Age: 48
End: 2023-12-27
Payer: COMMERCIAL

## 2023-12-27 VITALS
WEIGHT: 234.2 LBS | HEIGHT: 69 IN | DIASTOLIC BLOOD PRESSURE: 84 MMHG | OXYGEN SATURATION: 96 % | SYSTOLIC BLOOD PRESSURE: 118 MMHG | HEART RATE: 84 BPM | RESPIRATION RATE: 16 BRPM | BODY MASS INDEX: 34.69 KG/M2 | TEMPERATURE: 97.6 F

## 2023-12-27 DIAGNOSIS — E11.9 TYPE 2 DIABETES MELLITUS WITHOUT COMPLICATION, WITHOUT LONG-TERM CURRENT USE OF INSULIN (HCC): Primary | ICD-10-CM

## 2023-12-27 DIAGNOSIS — E78.1 HYPERTRIGLYCERIDEMIA: ICD-10-CM

## 2023-12-27 PROCEDURE — 99213 OFFICE O/P EST LOW 20 MIN: CPT | Performed by: FAMILY MEDICINE

## 2023-12-27 NOTE — TELEPHONE ENCOUNTER
At check out today, patient asked if you wanted him to have blood work done prior to next visit. There are no active orders in chart    Please advise

## 2023-12-27 NOTE — PROGRESS NOTES
Name: Stone Frank      : 1975      MRN: [id number]  Encounter Provider: Morris Nava DO  Encounter Date: 2023   Encounter department: [facility]    Assessment & Plan     1. Type 2 diabetes mellitus without complication, without long-term current use of insulin (HCC)  Assessment & Plan:    Lab Results   Component Value Date    HGBA1C 6.1 (H) 2023     -Continues to be well-controlled.   -Will continue metformin at 1000 mg daily  -Continue Rybelsus at 3 mg p.o. daily  -Continue to monitor diet-low carb, low sugar, whole food, high fiber eating  -Exercise recommendations as previously discussed  -Follow-up in 6 months.    Orders:  -     Comprehensive metabolic panel; Future  -     HEMOGLOBIN A1C W/ EAG ESTIMATION; Future    2. Hypertriglyceridemia  Assessment & Plan:  Lab Results   Component Value Date    CHOLESTEROL 127 2023    TRIG 341 (H) 2023    HDL 31 (L) 2023    LDLCALC 60 2023     -Continue Crestor 10 mg p.o. daily  -Continue dietary and lifestyle recommendations as discussed.    Orders:  -     Lipid Panel with Direct LDL reflex; Future        Depression Screening and Follow-up Plan: Patient was screened for depression during today's encounter. They screened negative with a PHQ-2 score of 0.        Beata Ritter is a 48-year-old male with past medical history of type 2 diabetes and hyperlipidemia.  He is following up today after recent change in medications.  Since changing he has had much less GI affects.  He is also working on his dietary changes.  He notes overall he is feeling very well.  Denies any episodes of high or low blood sugar.  He is also trying to increase activity level to help with some weight loss.  Denies any side effects to new medication.  Denies any fevers, chills, nausea, vomiting, diarrhea.  Denies any constipation.  Denies any unwanted weight loss or night sweats.      Review of Systems   Constitutional:   Negative for chills and fever.   HENT:  Negative for ear pain and sore throat.    Eyes:  Negative for pain and visual disturbance.   Respiratory:  Negative for cough and shortness of breath.    Cardiovascular:  Negative for chest pain and palpitations.   Gastrointestinal:  Negative for abdominal pain and vomiting.   Genitourinary:  Negative for dysuria and hematuria.   Musculoskeletal:  Negative for arthralgias and back pain.   Skin:  Negative for color change and rash.   Neurological:  Negative for seizures and syncope.   Psychiatric/Behavioral:  Negative for confusion. The patient is not nervous/anxious.    All other systems reviewed and are negative.      History reviewed. No pertinent past medical history.  History reviewed. No pertinent surgical history.  Family History   Problem Relation Age of Onset    Hypertension Father         Benign; Last Assessed 10/20/2017    Coronary artery disease Father         Last Assessed 10/20/2017    Diabetes Father         Mellitus; Last Assessed 10/20/2017    No Known Problems Sister     No Known Problems Sister      Social History     Socioeconomic History    Marital status: /Civil Union     Spouse name: None    Number of children: None    Years of education: None    Highest education level: None   Occupational History    None   Tobacco Use    Smoking status: Never     Passive exposure: Never    Smokeless tobacco: Never   Vaping Use    Vaping status: Never Used   Substance and Sexual Activity    Alcohol use: Yes     Alcohol/week: 4.0 standard drinks of alcohol     Types: 2 Cans of beer, 2 Shots of liquor per week     Comment: Social     Drug use: Never    Sexual activity: Yes     Partners: Female   Other Topics Concern    None   Social History Narrative    None     Social Determinants of Health     Financial Resource Strain: Not on file   Food Insecurity: Not on file   Transportation Needs: Not on file   Physical Activity: Not on file   Stress: Not on file   Social  "Connections: Not on file   Intimate Partner Violence: Not on file   Housing Stability: Not on file     Current Outpatient Medications on File Prior to Visit   Medication Sig    Blood Glucose Monitoring Suppl (OneTouch Verio Flex System) w/Device KIT Use as directed    Icosapent Ethyl 1 g CAPS Take 4 capsules by mouth daily    Lancets (OneTouch Delica Plus [id number]) MISC Inject 1 each under the skin in the morning    metFORMIN (GLUCOPHAGE-XR) 500 mg 24 hr tablet Take 2 tablets (1,000 mg total) by mouth daily with dinner    OneTouch Verio test strip Use 1 each daily Use as instructed    rosuvastatin (CRESTOR) 10 MG tablet TAKE 1 TABLET(10 MG) BY MOUTH DAILY    Rybelsus 3 MG tablet TAKE ONE TABLET BY MOUTH DAILY, TAKE ON AN EMPTY STOMACH WITH 4OZ WATER, AND WAIT 30 MINS BEFORE EATING     No Known Allergies  Immunization History   Administered Date(s) Administered    COVID-19 PFIZER VACCINE 0.3 ML IM 01/31/2021, 02/18/2021    INFLUENZA 10/20/2017, 10/20/2021    Influenza Quadrivalent Preservative Free 3 years and older IM 10/20/2017    Influenza, injectable, quadrivalent, preservative free 0.5 mL 12/24/2018    Influenza, recombinant, quadrivalent,injectable, preservative free 09/09/2022    Influenza, seasonal, injectable 10/20/2017       Objective     /84 (BP Location: Left arm, Patient Position: Sitting, Cuff Size: Large)   Pulse 84   Temp 97.6 °F (36.4 °C) (Tympanic)   Resp 16   Ht 5' 9.29\" (1.76 m)   Wt 106 kg (234 lb 3.2 oz)   SpO2 96%   BMI 34.30 kg/m²     Physical Exam  Vitals and nursing note reviewed.   Constitutional:       General: He is not in acute distress.     Appearance: Normal appearance. He is not ill-appearing.   HENT:      Head: Normocephalic and atraumatic.      Right Ear: Tympanic membrane and external ear normal.      Left Ear: Tympanic membrane normal.      Nose: Nose normal. No congestion.      Mouth/Throat:      Mouth: Mucous membranes are moist.      Pharynx: No oropharyngeal " exudate.   Eyes:      Extraocular Movements: Extraocular movements intact.      Conjunctiva/sclera: Conjunctivae normal.      Pupils: Pupils are equal, round, and reactive to light.   Cardiovascular:      Rate and Rhythm: Normal rate and regular rhythm.      Pulses: Normal pulses.      Heart sounds: Normal heart sounds. No murmur heard.  Pulmonary:      Effort: Pulmonary effort is normal.      Breath sounds: Normal breath sounds. No wheezing, rhonchi or rales.   Abdominal:      General: Bowel sounds are normal.      Palpations: Abdomen is soft.      Tenderness: There is no abdominal tenderness. There is no guarding or rebound.   Musculoskeletal:         General: Normal range of motion.      Cervical back: Normal range of motion.      Right lower leg: No edema.      Left lower leg: No edema.   Lymphadenopathy:      Cervical: No cervical adenopathy.   Skin:     General: Skin is warm.      Capillary Refill: Capillary refill takes less than 2 seconds.      Findings: No erythema.   Neurological:      General: No focal deficit present.      Mental Status: He is alert and oriented to person, place, and time.      Cranial Nerves: No cranial nerve deficit.      Motor: No weakness.   Psychiatric:         Mood and Affect: Mood normal.         Behavior: Behavior normal.       Morris Nava, DO

## 2023-12-27 NOTE — PROGRESS NOTES
Diabetic Foot Exam    Patient's shoes and socks removed.    Right Foot/Ankle   Right Foot Inspection  Skin Exam: skin normal and skin intact. No dry skin, no warmth, no callus, no erythema, no maceration, no abnormal color, no pre-ulcer, no ulcer and no callus.     Toe Exam: ROM and strength within normal limits.     Sensory   Monofilament testing: intact    Vascular  Capillary refills: < 3 seconds  The right DP pulse is 2+. The right PT pulse is 2+.     Left Foot/Ankle  Left Foot Inspection  Skin Exam: skin normal and skin intact. No dry skin, no warmth, no erythema, no maceration, normal color, no pre-ulcer, no ulcer and no callus.     Toe Exam: ROM and strength within normal limits.     Sensory   Monofilament testing: intact    Vascular  Capillary refills: < 3 seconds  The left DP pulse is 2+. The left PT pulse is 2+.     Assign Risk Category  No deformity present  No loss of protective sensation  No weak pulses  Risk: 0

## 2023-12-27 NOTE — TELEPHONE ENCOUNTER
Thank you for the message.  Can we please let him know that there are fasting labs to do about a week prior to next visit.  Sorry about that.  Thank you.

## 2023-12-29 LAB
LEFT EYE DIABETIC RETINOPATHY: NORMAL
RIGHT EYE DIABETIC RETINOPATHY: NORMAL
SEVERITY (EYE EXAM): NORMAL

## 2024-01-05 NOTE — ASSESSMENT & PLAN NOTE
Lab Results   Component Value Date    HGBA1C 6.1 (H) 12/22/2023     -Continues to be well-controlled.   -Will continue metformin at 1000 mg daily  -Continue Rybelsus at 3 mg p.o. daily  -Continue to monitor diet-low carb, low sugar, whole food, high fiber eating  -Exercise recommendations as previously discussed  -Follow-up in 6 months.

## 2024-01-05 NOTE — ASSESSMENT & PLAN NOTE
Lab Results   Component Value Date    CHOLESTEROL 127 12/22/2023    TRIG 341 (H) 12/22/2023    HDL 31 (L) 12/22/2023    LDLCALC 60 12/22/2023     -Continue Crestor 10 mg p.o. daily  -Continue dietary and lifestyle recommendations as discussed.

## 2024-01-08 ENCOUNTER — VBI (OUTPATIENT)
Dept: ADMINISTRATIVE | Facility: OTHER | Age: 49
End: 2024-01-08

## 2024-02-15 DIAGNOSIS — E11.9 TYPE 2 DIABETES MELLITUS WITHOUT COMPLICATION, WITHOUT LONG-TERM CURRENT USE OF INSULIN (HCC): ICD-10-CM

## 2024-02-16 RX ORDER — METFORMIN HYDROCHLORIDE 500 MG/1
1000 TABLET, EXTENDED RELEASE ORAL
Qty: 90 TABLET | Refills: 1 | Status: SHIPPED | OUTPATIENT
Start: 2024-02-16

## 2024-02-21 ENCOUNTER — TELEPHONE (OUTPATIENT)
Dept: FAMILY MEDICINE CLINIC | Facility: CLINIC | Age: 49
End: 2024-02-21

## 2024-02-21 NOTE — TELEPHONE ENCOUNTER
Patient called back stating that the pharmacy had told him the issue was that it was a 90 day so they switched it to a 30 days to get it approved. RYLAN

## 2024-02-21 NOTE — TELEPHONE ENCOUNTER
Patient called stating that the pharmacy is waiting for our approval to fill the metformin. Informed him that it looks like they received the script that we had sen on 021624 and asked the patient if he had to have a prior auth before for this medication. Patient stated that he did not. Please call patients pharmacy to find out exactly what they need us to do.

## 2024-04-04 DIAGNOSIS — E11.9 TYPE 2 DIABETES MELLITUS WITHOUT COMPLICATION, WITHOUT LONG-TERM CURRENT USE OF INSULIN (HCC): ICD-10-CM

## 2024-05-11 DIAGNOSIS — E11.9 TYPE 2 DIABETES MELLITUS WITHOUT COMPLICATION, WITHOUT LONG-TERM CURRENT USE OF INSULIN (HCC): ICD-10-CM

## 2024-05-23 ENCOUNTER — TELEPHONE (OUTPATIENT)
Dept: FAMILY MEDICINE CLINIC | Facility: CLINIC | Age: 49
End: 2024-05-23

## 2024-05-23 DIAGNOSIS — E11.9 TYPE 2 DIABETES MELLITUS WITHOUT COMPLICATION, WITHOUT LONG-TERM CURRENT USE OF INSULIN (HCC): ICD-10-CM

## 2024-05-23 RX ORDER — METFORMIN HYDROCHLORIDE 500 MG/1
1000 TABLET, EXTENDED RELEASE ORAL
Qty: 90 TABLET | Refills: 1 | Status: SHIPPED | OUTPATIENT
Start: 2024-05-23 | End: 2024-05-29 | Stop reason: SDUPTHER

## 2024-05-23 NOTE — TELEPHONE ENCOUNTER
Medication:   metFORMIN (GLUCOPHAGE-XR) 500 mg 24 hr tablet   Dosage: 1,000 mg  How Often: Take 2 tablets (1,000 mg total) by mouth daily with dinner   Quantity:  90  Last Office Visit: 12/27/2023  Next Office Visit:  Last refilled: 02/16/2024  How many pills left: 8  Pharmacy:   [id number] [place] [facility] [address]  [id number]  [place] [facility] [place] [address]  Phone: [phone] Fax: [phone]           Patient also asking if his Rybelsus can be sent in with more than 1 refill so he does not have to call every month for a refill    Please advise

## 2024-05-29 DIAGNOSIS — E11.9 TYPE 2 DIABETES MELLITUS WITHOUT COMPLICATION, WITHOUT LONG-TERM CURRENT USE OF INSULIN (HCC): ICD-10-CM

## 2024-05-29 RX ORDER — METFORMIN HYDROCHLORIDE 500 MG/1
1000 TABLET, EXTENDED RELEASE ORAL
Qty: 180 TABLET | Refills: 0 | Status: SHIPPED | OUTPATIENT
Start: 2024-05-29

## 2024-08-26 LAB — HBA1C MFR BLD HPLC: 6.7 %

## 2024-08-28 ENCOUNTER — OFFICE VISIT (OUTPATIENT)
Dept: FAMILY MEDICINE CLINIC | Facility: CLINIC | Age: 49
End: 2024-08-28
Payer: COMMERCIAL

## 2024-08-28 VITALS
SYSTOLIC BLOOD PRESSURE: 120 MMHG | BODY MASS INDEX: 34.98 KG/M2 | DIASTOLIC BLOOD PRESSURE: 82 MMHG | TEMPERATURE: 98.3 F | RESPIRATION RATE: 16 BRPM | WEIGHT: 236.2 LBS | HEART RATE: 77 BPM | OXYGEN SATURATION: 97 % | HEIGHT: 69 IN

## 2024-08-28 DIAGNOSIS — E11.9 TYPE 2 DIABETES MELLITUS WITHOUT COMPLICATION, WITHOUT LONG-TERM CURRENT USE OF INSULIN (HCC): Primary | ICD-10-CM

## 2024-08-28 DIAGNOSIS — E66.09 CLASS 1 OBESITY DUE TO EXCESS CALORIES WITHOUT SERIOUS COMORBIDITY WITH BODY MASS INDEX (BMI) OF 34.0 TO 34.9 IN ADULT: ICD-10-CM

## 2024-08-28 LAB
CREAT UR-MCNC: 143.5 MG/DL
MICROALBUMIN UR-MCNC: <7 MG/L

## 2024-08-28 PROCEDURE — 3074F SYST BP LT 130 MM HG: CPT | Performed by: FAMILY MEDICINE

## 2024-08-28 PROCEDURE — 99213 OFFICE O/P EST LOW 20 MIN: CPT | Performed by: FAMILY MEDICINE

## 2024-08-28 PROCEDURE — 3079F DIAST BP 80-89 MM HG: CPT | Performed by: FAMILY MEDICINE

## 2024-08-28 PROCEDURE — 82570 ASSAY OF URINE CREATININE: CPT | Performed by: FAMILY MEDICINE

## 2024-08-28 PROCEDURE — 82043 UR ALBUMIN QUANTITATIVE: CPT | Performed by: FAMILY MEDICINE

## 2024-08-28 PROCEDURE — 3725F SCREEN DEPRESSION PERFORMED: CPT | Performed by: FAMILY MEDICINE

## 2024-08-28 NOTE — PROGRESS NOTES
"Adult Annual Physical  Name: Stone Frank      : 1975      MRN: [id number]  Encounter Provider: Morris Nava DO  Encounter Date: 2024   Encounter department: [facility]    Assessment & Plan   1. Annual physical exam  2. BMI 34.0-34.9,adult  Immunizations and preventive care screenings were discussed with patient today. Appropriate education was printed on patient's after visit summary.    {Prostate cancer screening - consider in males between age of 40-75 depending on age, race, and risk factors. There is difference in the guidelines in regards to the optimal age for screening.  USPSTF states to consider periodic screening in males between the age of 50 to 69. This text is informational and does not need to be selected but if you wish, you can insert standard documentation in your progress note by using F2 (Optional):20307}    Counseling:  {Annual Physical; Counselin}         History of Present Illness   {Disappearing Hyperlinks I Encounters * My Last Note * Since Last Visit * History :83794}  Adult Annual Physical  Review of Systems  {Select to Display PMH (Optional):45928}    Objective   {Disappearing Hyperlinks   Review Vitals * Enter New Vitals * Results Review * Labs * Imaging * Cardiology * Procedures * Lung Cancer Screening :00566}  /82 (BP Location: Left arm, Patient Position: Sitting, Cuff Size: Standard)   Pulse 77   Temp 98.3 °F (36.8 °C) (Tympanic)   Resp 16   Ht 5' 9.29\" (1.76 m)   Wt 107 kg (236 lb 3.2 oz)   SpO2 97%   BMI 34.59 kg/m²     Physical Exam  {Administrative / Billing Section (Optional):66227}  "

## 2024-08-28 NOTE — PATIENT INSTRUCTIONS
"Patient Education     Routine physical for adults   The Basics   Written by the doctors and editors at [facility]   What is a physical? -- A physical is a routine visit, or \"check-up,\" with your doctor. You might also hear it called a \"wellness visit\" or \"preventive visit.\"  During each visit, the doctor will:   Ask about your physical and mental health   Ask about your habits, behaviors, and lifestyle   Do an exam   Give you vaccines if needed   Talk to you about any medicines you take   Give advice about your health   Answer your questions  Getting regular check-ups is an important part of taking care of your health. It can help your doctor find and treat any problems you have. But it's also important for preventing health problems.  A routine physical is different from a \"sick visit.\" A sick visit is when you see a doctor because of a health concern or problem. Since physicals are scheduled ahead of time, you can think about what you want to ask the doctor.  How often should I get a physical? -- It depends on your age and health. In general, for people age 21 years and older:   If you are younger than 50 years, you might be able to get a physical every 3 years.   If you are 50 years or older, your doctor might recommend a physical every year.  If you have an ongoing health condition, like diabetes or high blood pressure, your doctor will probably want to see you more often.  What happens during a physical? -- In general, each visit will include:   Physical exam - The doctor or nurse will check your height, weight, heart rate, and blood pressure. They will also look at your eyes and ears. They will ask about how you are feeling and whether you have any symptoms that bother you.   Medicines - It's a good idea to bring a list of all the medicines you take to each doctor visit. Your doctor will talk to you about your medicines and answer any questions. Tell them if you are having any side effects that bother you. You " "should also tell them if you are having trouble paying for any of your medicines.   Habits and behaviors - This includes:   Your diet   Your exercise habits   Whether you smoke, drink alcohol, or use drugs   Whether you are sexually active   Whether you feel safe at home  Your doctor will talk to you about things you can do to improve your health and lower your risk of health problems. They will also offer help and support. For example, if you want to quit smoking, they can give you advice and might prescribe medicines. If you want to improve your diet or get more physical activity, they can help you with this, too.   Lab tests, if needed - The tests you get will depend on your age and situation. For example, your doctor might want to check your:   Cholesterol   Blood sugar   Iron level   Vaccines - The recommended vaccines will depend on your age, health, and what vaccines you already had. Vaccines are very important because they can prevent certain serious or deadly infections.   Discussion of screening - \"Screening\" means checking for diseases or other health problems before they cause symptoms. Your doctor can recommend screening based on your age, risk, and preferences. This might include tests to check for:   Cancer, such as breast, prostate, cervical, ovarian, colorectal, prostate, lung, or skin cancer   Sexually transmitted infections, such as chlamydia and gonorrhea   Mental health conditions like depression and anxiety  Your doctor will talk to you about the different types of screening tests. They can help you decide which screenings to have. They can also explain what the results might mean.   Answering questions - The physical is a good time to ask the doctor or nurse questions about your health. If needed, they can refer you to other doctors or specialists, too.  Adults older than 65 years often need other care, too. As you get older, your doctor will talk to you about:   How to prevent falling at " home   Hearing or vision tests   Memory testing   How to take your medicines safely   Making sure that you have the help and support you need at home  All topics are updated as new evidence becomes available and our peer review process is complete.  This topic retrieved from [employer] on: May 02, 2024.  Topic 216971 Version 1.0  Release: 32.4.3 - C32.122  © 2024 UpToDate, Inc. and/or its affiliates. All rights reserved.  Consumer Information Use and Disclaimer   Disclaimer: This generalized information is a limited summary of diagnosis, treatment, and/or medication information. It is not meant to be comprehensive and should be used as a tool to help the user understand and/or assess potential diagnostic and treatment options. It does NOT include all information about conditions, treatments, medications, side effects, or risks that may apply to a specific patient. It is not intended to be medical advice or a substitute for the medical advice, diagnosis, or treatment of a health care provider based on the health care provider's examination and assessment of a patient's specific and unique circumstances. Patients must speak with a health care provider for complete information about their health, medical questions, and treatment options, including any risks or benefits regarding use of medications. This information does not endorse any treatments or medications as safe, effective, or approved for treating a specific patient. UpToDate, Inc. and its affiliates disclaim any warranty or liability relating to this information or the use thereof.The use of this information is governed by the Terms of Use, available at https://www.wolters[employer] Twistuwer.com/en/know/clinical-effectiveness-terms. 2024© UpToDate, Inc. and its affiliates and/or licensors. All rights reserved.  Copyright   © 2024 UpToDate, Inc. and/or its affiliates. All rights reserved.

## 2024-09-05 NOTE — PROGRESS NOTES
Ambulatory Visit  Name: Stone Frank      : 1975      MRN: [id number]  Encounter Provider: Morris Nava DO  Encounter Date: 2024   Encounter department: [facility]    Assessment & Plan   1. Type 2 diabetes mellitus without complication, without long-term current use of insulin ([facility])  Assessment & Plan:    Lab Results   Component Value Date    HGBA1C 6.7 2024     -Hemoglobin A1c increased.    -Take medications as prescribed.  -Continue metformin 1000 mg with dinner.  -Will increase Rybelsus to 7 mg p.o. daily.  -Continue working on dietary and lifestyle modifications as reviewed.  Exercise recommendations reviewed.  -Follow-up in 6 months.  Orders:  -     semaglutide (Rybelsus) 7 MG tablet; Take 1 tablet (7 mg total) by mouth daily before breakfast  -     Albumin / creatinine urine ratio  2. BMI 34.0-34.9,adult  3. Class 1 obesity due to excess calories without serious comorbidity with body mass index (BMI) of 34.0 to 34.9 in adult  -     semaglutide (Rybelsus) 7 MG tablet; Take 1 tablet (7 mg total) by mouth daily before breakfast         History of Present Illness     Stone is a 49-year-old male presents today for follow-up.  He notes overall he is doing well.  He is taking all his medications as prescribed without any noted side effects.  Does note has been less strict with his diet over the summer.  Denies any symptoms of high or low blood sugars.  He is working to restrict his diet and increase exercise at this time.  Denies any concerns with sleeping.  Getting about 6 to 8 hours of sleep every night, no noted snoring or frequent awakenings.  No other concerns today.      Review of Systems   Constitutional:  Negative for chills and fever.   HENT:  Negative for ear pain and sore throat.    Eyes:  Negative for pain and visual disturbance.   Respiratory:  Negative for cough and shortness of breath.    Cardiovascular:  Negative for chest pain and palpitations.    Gastrointestinal:  Negative for abdominal pain and vomiting.   Genitourinary:  Negative for dysuria and hematuria.   Musculoskeletal:  Negative for arthralgias and back pain.   Skin:  Negative for color change and rash.   Neurological:  Negative for seizures and syncope.   Psychiatric/Behavioral:  Negative for confusion and sleep disturbance. The patient is not nervous/anxious.    All other systems reviewed and are negative.    History reviewed. No pertinent past medical history.  History reviewed. No pertinent surgical history.  Family History   Problem Relation Age of Onset    Hypertension Father         Benign; Last Assessed 10/20/2017    Coronary artery disease Father         Last Assessed 10/20/2017    Diabetes Father         Mellitus; Last Assessed 10/20/2017    No Known Problems Sister     No Known Problems Sister      Social History     Tobacco Use    Smoking status: Never     Passive exposure: Never    Smokeless tobacco: Never   Vaping Use    Vaping status: Never Used   Substance and Sexual Activity    Alcohol use: Yes     Alcohol/week: 4.0 standard drinks of alcohol     Types: 2 Cans of beer, 2 Shots of liquor per week     Comment: Social     Drug use: Never    Sexual activity: Yes     Partners: Female     Current Outpatient Medications on File Prior to Visit   Medication Sig    Blood Glucose Monitoring Suppl (OneTouch Verio Flex System) w/Device KIT Use as directed    glucose blood test strip USE 1 STRIP DAILY AS INSTRUCTED    Icosapent Ethyl 1 g CAPS Take 4 capsules by mouth daily    Lancets (OneTouch Delica Plus [id number]) MISC Inject 1 each under the skin in the morning    metFORMIN (GLUCOPHAGE-XR) 500 mg 24 hr tablet Take 2 tablets (1,000 mg total) by mouth daily with dinner    OneTouch Verio test strip Use 1 each daily Use as instructed    rosuvastatin (CRESTOR) 10 MG tablet TAKE 1 TABLET(10 MG) BY MOUTH DAILY     No Known Allergies  Immunization History   Administered Date(s) Administered     "COVID-19 PFIZER VACCINE 0.3 ML IM 01/31/2021, 02/18/2021    INFLUENZA 10/20/2017, 10/20/2021    Influenza Quadrivalent Preservative Free 3 years and older IM 10/20/2017    Influenza, injectable, quadrivalent, preservative free 0.5 mL 12/24/2018    Influenza, recombinant, quadrivalent,injectable, preservative free 09/09/2022    Influenza, seasonal, injectable 10/20/2017     Objective     /82 (BP Location: Left arm, Patient Position: Sitting, Cuff Size: Standard)   Pulse 77   Temp 98.3 °F (36.8 °C) (Tympanic)   Resp 16   Ht 5' 9.29\" (1.76 m)   Wt 107 kg (236 lb 3.2 oz)   SpO2 97%   BMI 34.59 kg/m²     Physical Exam  Vitals and nursing note reviewed.   Constitutional:       General: He is not in acute distress.     Appearance: Normal appearance.   HENT:      Head: Normocephalic and atraumatic.      Right Ear: Tympanic membrane and external ear normal.      Left Ear: Tympanic membrane and external ear normal.      Nose: Nose normal.      Mouth/Throat:      Mouth: Mucous membranes are moist.   Eyes:      Extraocular Movements: Extraocular movements intact.      Conjunctiva/sclera: Conjunctivae normal.      Pupils: Pupils are equal, round, and reactive to light.   Cardiovascular:      Rate and Rhythm: Normal rate and regular rhythm.      Pulses: Normal pulses.      Heart sounds: Normal heart sounds. No murmur heard.  Pulmonary:      Effort: Pulmonary effort is normal.      Breath sounds: Normal breath sounds. No wheezing, rhonchi or rales.   Abdominal:      General: Bowel sounds are normal.      Palpations: Abdomen is soft.      Tenderness: There is no abdominal tenderness. There is no guarding.   Musculoskeletal:         General: Normal range of motion.      Cervical back: Normal range of motion.      Right lower leg: No edema.      Left lower leg: No edema.   Lymphadenopathy:      Cervical: No cervical adenopathy.   Skin:     General: Skin is warm.      Capillary Refill: Capillary refill takes less than 2 " seconds.   Neurological:      General: No focal deficit present.      Mental Status: He is alert and oriented to person, place, and time.   Psychiatric:         Mood and Affect: Mood normal.         Behavior: Behavior normal.

## 2024-09-05 NOTE — ASSESSMENT & PLAN NOTE
Lab Results   Component Value Date    HGBA1C 6.7 08/26/2024     -Hemoglobin A1c increased.    -Take medications as prescribed.  -Continue metformin 1000 mg with dinner.  -Will increase Rybelsus to 7 mg p.o. daily.  -Continue working on dietary and lifestyle modifications as reviewed.  Exercise recommendations reviewed.  -Follow-up in 6 months.

## 2024-09-26 DIAGNOSIS — E78.2 MIXED HYPERLIPIDEMIA: ICD-10-CM

## 2024-09-26 RX ORDER — ROSUVASTATIN CALCIUM 10 MG/1
10 TABLET, COATED ORAL DAILY
Qty: 90 TABLET | Refills: 1 | Status: SHIPPED | OUTPATIENT
Start: 2024-09-26

## 2024-11-22 DIAGNOSIS — E11.9 TYPE 2 DIABETES MELLITUS WITHOUT COMPLICATION, WITHOUT LONG-TERM CURRENT USE OF INSULIN (HCC): ICD-10-CM

## 2024-11-22 RX ORDER — METFORMIN HYDROCHLORIDE 500 MG/1
TABLET, EXTENDED RELEASE ORAL
Qty: 90 TABLET | Refills: 1 | Status: SHIPPED | OUTPATIENT
Start: 2024-11-22

## 2025-01-16 ENCOUNTER — TELEPHONE (OUTPATIENT)
Age: 50
End: 2025-01-16

## 2025-01-16 ENCOUNTER — PATIENT MESSAGE (OUTPATIENT)
Dept: FAMILY MEDICINE CLINIC | Facility: CLINIC | Age: 50
End: 2025-01-16

## 2025-01-16 NOTE — TELEPHONE ENCOUNTER
Patient contacted about My Chart message.  Since 12/11, the patient has been having diarrhea, 4-5 times a day.  Endorses cramping that gets better after he has a BM.  It is waking him up at night.  Watery brown.  Denies blood or mucus.  Denies fever or contact with any other people with GI illnesses.  He had Flu A a month ago and his family presently has URI illnesses.      No known food exposure.  Did eat out at an [employer] restaurant on 12/10, but did not notice anything wrong.      Should he come in for a visit?  Or get stool cultures?    Please advise.  Next appointment is not unit 1/22.

## 2025-02-05 ENCOUNTER — TELEPHONE (OUTPATIENT)
Dept: FAMILY MEDICINE CLINIC | Facility: CLINIC | Age: 50
End: 2025-02-05

## 2025-03-03 DIAGNOSIS — E66.09 CLASS 1 OBESITY DUE TO EXCESS CALORIES WITHOUT SERIOUS COMORBIDITY WITH BODY MASS INDEX (BMI) OF 34.0 TO 34.9 IN ADULT: ICD-10-CM

## 2025-03-03 DIAGNOSIS — E11.9 TYPE 2 DIABETES MELLITUS WITHOUT COMPLICATION, WITHOUT LONG-TERM CURRENT USE OF INSULIN (HCC): ICD-10-CM

## 2025-03-03 DIAGNOSIS — E66.811 CLASS 1 OBESITY DUE TO EXCESS CALORIES WITHOUT SERIOUS COMORBIDITY WITH BODY MASS INDEX (BMI) OF 34.0 TO 34.9 IN ADULT: ICD-10-CM

## 2025-03-04 RX ORDER — METFORMIN HYDROCHLORIDE 500 MG/1
500 TABLET, EXTENDED RELEASE ORAL 2 TIMES DAILY
Qty: 180 TABLET | Refills: 0 | Status: SHIPPED | OUTPATIENT
Start: 2025-03-04

## 2025-03-25 DIAGNOSIS — E78.2 MIXED HYPERLIPIDEMIA: ICD-10-CM

## 2025-03-25 RX ORDER — ROSUVASTATIN CALCIUM 10 MG/1
10 TABLET, COATED ORAL DAILY
Qty: 90 TABLET | Refills: 1 | Status: SHIPPED | OUTPATIENT
Start: 2025-03-25

## 2025-04-09 DIAGNOSIS — E66.09 CLASS 1 OBESITY DUE TO EXCESS CALORIES WITHOUT SERIOUS COMORBIDITY WITH BODY MASS INDEX (BMI) OF 34.0 TO 34.9 IN ADULT: ICD-10-CM

## 2025-04-09 DIAGNOSIS — E66.811 CLASS 1 OBESITY DUE TO EXCESS CALORIES WITHOUT SERIOUS COMORBIDITY WITH BODY MASS INDEX (BMI) OF 34.0 TO 34.9 IN ADULT: ICD-10-CM

## 2025-04-09 DIAGNOSIS — E11.9 TYPE 2 DIABETES MELLITUS WITHOUT COMPLICATION, WITHOUT LONG-TERM CURRENT USE OF INSULIN (HCC): ICD-10-CM

## 2025-04-25 ENCOUNTER — TELEPHONE (OUTPATIENT)
Dept: FAMILY MEDICINE CLINIC | Facility: CLINIC | Age: 50
End: 2025-04-25

## 2025-04-25 NOTE — TELEPHONE ENCOUNTER
Patient is due for physical after 09/27. Left message to patient to schedule appointment. Please schedule.

## 2025-05-16 DIAGNOSIS — E11.9 TYPE 2 DIABETES MELLITUS WITHOUT COMPLICATION, WITHOUT LONG-TERM CURRENT USE OF INSULIN (HCC): ICD-10-CM

## 2025-05-16 DIAGNOSIS — E66.811 CLASS 1 OBESITY DUE TO EXCESS CALORIES WITHOUT SERIOUS COMORBIDITY WITH BODY MASS INDEX (BMI) OF 34.0 TO 34.9 IN ADULT: ICD-10-CM

## 2025-05-16 DIAGNOSIS — E66.09 CLASS 1 OBESITY DUE TO EXCESS CALORIES WITHOUT SERIOUS COMORBIDITY WITH BODY MASS INDEX (BMI) OF 34.0 TO 34.9 IN ADULT: ICD-10-CM

## 2025-05-16 RX ORDER — ORAL SEMAGLUTIDE 7 MG/1
TABLET ORAL
Qty: 30 TABLET | Refills: 0 | Status: SHIPPED | OUTPATIENT
Start: 2025-05-16

## 2025-06-12 DIAGNOSIS — E11.9 TYPE 2 DIABETES MELLITUS WITHOUT COMPLICATION, WITHOUT LONG-TERM CURRENT USE OF INSULIN (HCC): ICD-10-CM

## 2025-06-12 DIAGNOSIS — E66.09 CLASS 1 OBESITY DUE TO EXCESS CALORIES WITHOUT SERIOUS COMORBIDITY WITH BODY MASS INDEX (BMI) OF 34.0 TO 34.9 IN ADULT: ICD-10-CM

## 2025-06-12 DIAGNOSIS — E66.811 CLASS 1 OBESITY DUE TO EXCESS CALORIES WITHOUT SERIOUS COMORBIDITY WITH BODY MASS INDEX (BMI) OF 34.0 TO 34.9 IN ADULT: ICD-10-CM

## 2025-06-13 RX ORDER — METFORMIN HYDROCHLORIDE 500 MG/1
500 TABLET, EXTENDED RELEASE ORAL 2 TIMES DAILY
Qty: 180 TABLET | Refills: 0 | Status: SHIPPED | OUTPATIENT
Start: 2025-06-13

## 2025-06-13 RX ORDER — ORAL SEMAGLUTIDE 7 MG/1
TABLET ORAL
Qty: 30 TABLET | Refills: 0 | OUTPATIENT
Start: 2025-06-13

## 2025-07-22 ENCOUNTER — OFFICE VISIT (OUTPATIENT)
Dept: FAMILY MEDICINE CLINIC | Facility: CLINIC | Age: 50
End: 2025-07-22
Payer: COMMERCIAL

## 2025-07-22 VITALS
HEART RATE: 95 BPM | TEMPERATURE: 98.3 F | RESPIRATION RATE: 18 BRPM | HEIGHT: 69 IN | SYSTOLIC BLOOD PRESSURE: 130 MMHG | BODY MASS INDEX: 35.93 KG/M2 | DIASTOLIC BLOOD PRESSURE: 86 MMHG | OXYGEN SATURATION: 94 % | WEIGHT: 242.6 LBS

## 2025-07-22 DIAGNOSIS — E78.1 HYPERTRIGLYCERIDEMIA: ICD-10-CM

## 2025-07-22 DIAGNOSIS — M62.830 LUMBAR PARASPINAL MUSCLE SPASM: Primary | ICD-10-CM

## 2025-07-22 DIAGNOSIS — E11.9 TYPE 2 DIABETES MELLITUS WITHOUT COMPLICATION, WITHOUT LONG-TERM CURRENT USE OF INSULIN (HCC): ICD-10-CM

## 2025-07-22 PROCEDURE — 99214 OFFICE O/P EST MOD 30 MIN: CPT | Performed by: FAMILY MEDICINE

## 2025-07-22 RX ORDER — MELOXICAM 15 MG/1
15 TABLET ORAL DAILY PRN
Qty: 30 TABLET | Refills: 3 | Status: SHIPPED | OUTPATIENT
Start: 2025-07-22

## 2025-07-22 RX ORDER — METHOCARBAMOL 750 MG/1
750 TABLET, FILM COATED ORAL 3 TIMES DAILY
Qty: 30 TABLET | Refills: 0 | Status: SHIPPED | OUTPATIENT
Start: 2025-07-22

## 2025-07-22 NOTE — ASSESSMENT & PLAN NOTE
Check a1c  Lab Results   Component Value Date    HGBA1C 6.7 08/26/2024       Orders:  •  Hemoglobin A1c (w/out EAG) ([facility] ONLY); Future  •  Microalbumin, Random Urine (W/Creatinine) ([facility] ONLY); Future

## 2025-07-22 NOTE — ASSESSMENT & PLAN NOTE
Cont current management  Orders:  •  meloxicam (MOBIC) 15 mg tablet; Take 1 tablet (15 mg total) by mouth daily as needed for moderate pain  •  methocarbamol (ROBAXIN) 750 mg tablet; Take 1 tablet (750 mg total) by mouth 3 (three) times a day

## 2025-07-22 NOTE — PROGRESS NOTES
"Name: Stone Frank      : 1975      MRN: [id number]  Encounter Provider: Roseanna Ta DO  Encounter Date: 2025   Encounter department: [facility]    :  Assessment & Plan  Lumbar paraspinal muscle spasm  Cont current management  Orders:  •  meloxicam (MOBIC) 15 mg tablet; Take 1 tablet (15 mg total) by mouth daily as needed for moderate pain  •  methocarbamol (ROBAXIN) 750 mg tablet; Take 1 tablet (750 mg total) by mouth 3 (three) times a day    Type 2 diabetes mellitus without complication, without long-term current use of insulin ([facility])  Check a1c  Lab Results   Component Value Date    HGBA1C 6.7 2024       Orders:  •  Hemoglobin A1c (w/out EAG) ([facility] ONLY); Future  •  Microalbumin, Random Urine (W/Creatinine) ([facility] ONLY); Future    Hypertriglyceridemia    Orders:  •  CBC and differential; Future  •  Comprehensive metabolic panel; Future  •  TSH, 3rd generation with Free T4 reflex; Future  •  Lipid panel; Future      Assessment & Plan             History of Present Illness     History of Present Illness  Here for 3 week history of back pain  Has history of herniated disc  Did teledoc and given voltaren  Saw chiropractor and given adjustment  Has done deep tissue      Review of Systems   Constitutional: Negative.    HENT: Negative.     Eyes: Negative.    Respiratory: Negative.     Cardiovascular: Negative.    Gastrointestinal: Negative.    Endocrine: Negative.    Genitourinary: Negative.    Musculoskeletal:  Positive for back pain.   Allergic/Immunologic: Negative.    Neurological: Negative.    Hematological: Negative.    Psychiatric/Behavioral: Negative.       Objective   /86 (BP Location: Left arm, Patient Position: Sitting, Cuff Size: Large)   Pulse 95   Temp 98.3 °F (36.8 °C) (Tympanic)   Resp 18   Ht 5' 9\" (1.753 m)   Wt 110 kg (242 lb 9.6 oz)   SpO2 94%   BMI 35.83 kg/m²     Physical Exam    Physical Exam  Vitals and nursing note reviewed. "   Constitutional:       Appearance: Normal appearance.   HENT:      Head: Normocephalic and atraumatic.     Cardiovascular:      Rate and Rhythm: Normal rate and regular rhythm.      Pulses: Normal pulses.      Heart sounds: Normal heart sounds.     Musculoskeletal:         General: Swelling (paraspinal) and tenderness present.     Neurological:      General: No focal deficit present.      Mental Status: He is alert and oriented to person, place, and time.     Psychiatric:         Mood and Affect: Mood normal.         Behavior: Behavior normal.         Thought Content: Thought content normal.         Judgment: Judgment normal.

## 2025-07-22 NOTE — ASSESSMENT & PLAN NOTE
Orders:  •  CBC and differential; Future  •  Comprehensive metabolic panel; Future  •  TSH, 3rd generation with Free T4 reflex; Future  •  Lipid panel; Future